# Patient Record
Sex: FEMALE | Race: BLACK OR AFRICAN AMERICAN | NOT HISPANIC OR LATINO | URBAN - METROPOLITAN AREA
[De-identification: names, ages, dates, MRNs, and addresses within clinical notes are randomized per-mention and may not be internally consistent; named-entity substitution may affect disease eponyms.]

---

## 2023-04-14 PROBLEM — Z00.00 ENCOUNTER FOR PREVENTIVE HEALTH EXAMINATION: Status: ACTIVE | Noted: 2023-04-14

## 2023-10-25 ENCOUNTER — INPATIENT (INPATIENT)
Facility: HOSPITAL | Age: 68
LOS: 6 days | Discharge: HOME HEALTH | End: 2023-11-01
Attending: INTERNAL MEDICINE | Admitting: INTERNAL MEDICINE
Payer: MEDICARE

## 2023-10-25 VITALS
SYSTOLIC BLOOD PRESSURE: 134 MMHG | RESPIRATION RATE: 15 BRPM | TEMPERATURE: 98 F | DIASTOLIC BLOOD PRESSURE: 83 MMHG | HEART RATE: 82 BPM | OXYGEN SATURATION: 98 %

## 2023-10-25 DIAGNOSIS — Z93.3 COLOSTOMY STATUS: Chronic | ICD-10-CM

## 2023-10-25 DIAGNOSIS — Z87.19 PERSONAL HISTORY OF OTHER DISEASES OF THE DIGESTIVE SYSTEM: Chronic | ICD-10-CM

## 2023-10-25 DIAGNOSIS — Z98.890 OTHER SPECIFIED POSTPROCEDURAL STATES: Chronic | ICD-10-CM

## 2023-10-25 LAB
A1C WITH ESTIMATED AVERAGE GLUCOSE RESULT: 6.4 % — HIGH (ref 4–5.6)
A1C WITH ESTIMATED AVERAGE GLUCOSE RESULT: 6.4 % — HIGH (ref 4–5.6)
ALBUMIN SERPL ELPH-MCNC: 4 G/DL — SIGNIFICANT CHANGE UP (ref 3.3–5)
ALBUMIN SERPL ELPH-MCNC: 4 G/DL — SIGNIFICANT CHANGE UP (ref 3.3–5)
ALP SERPL-CCNC: 102 U/L — SIGNIFICANT CHANGE UP (ref 40–120)
ALP SERPL-CCNC: 102 U/L — SIGNIFICANT CHANGE UP (ref 40–120)
ALT FLD-CCNC: 7 U/L — SIGNIFICANT CHANGE UP (ref 4–33)
ALT FLD-CCNC: 7 U/L — SIGNIFICANT CHANGE UP (ref 4–33)
ANION GAP SERPL CALC-SCNC: 9 MMOL/L — SIGNIFICANT CHANGE UP (ref 7–14)
ANION GAP SERPL CALC-SCNC: 9 MMOL/L — SIGNIFICANT CHANGE UP (ref 7–14)
APTT BLD: 34.2 SEC — SIGNIFICANT CHANGE UP (ref 24.5–35.6)
APTT BLD: 34.2 SEC — SIGNIFICANT CHANGE UP (ref 24.5–35.6)
AST SERPL-CCNC: 7 U/L — SIGNIFICANT CHANGE UP (ref 4–32)
AST SERPL-CCNC: 7 U/L — SIGNIFICANT CHANGE UP (ref 4–32)
B PERT DNA SPEC QL NAA+PROBE: SIGNIFICANT CHANGE UP
B PERT DNA SPEC QL NAA+PROBE: SIGNIFICANT CHANGE UP
B PERT+PARAPERT DNA PNL SPEC NAA+PROBE: SIGNIFICANT CHANGE UP
B PERT+PARAPERT DNA PNL SPEC NAA+PROBE: SIGNIFICANT CHANGE UP
BASOPHILS # BLD AUTO: 0.02 K/UL — SIGNIFICANT CHANGE UP (ref 0–0.2)
BASOPHILS # BLD AUTO: 0.02 K/UL — SIGNIFICANT CHANGE UP (ref 0–0.2)
BASOPHILS NFR BLD AUTO: 0.3 % — SIGNIFICANT CHANGE UP (ref 0–2)
BASOPHILS NFR BLD AUTO: 0.3 % — SIGNIFICANT CHANGE UP (ref 0–2)
BILIRUB SERPL-MCNC: 0.3 MG/DL — SIGNIFICANT CHANGE UP (ref 0.2–1.2)
BILIRUB SERPL-MCNC: 0.3 MG/DL — SIGNIFICANT CHANGE UP (ref 0.2–1.2)
BLOOD GAS VENOUS COMPREHENSIVE RESULT: SIGNIFICANT CHANGE UP
BLOOD GAS VENOUS COMPREHENSIVE RESULT: SIGNIFICANT CHANGE UP
BORDETELLA PARAPERTUSSIS (RAPRVP): SIGNIFICANT CHANGE UP
BORDETELLA PARAPERTUSSIS (RAPRVP): SIGNIFICANT CHANGE UP
BUN SERPL-MCNC: 24 MG/DL — HIGH (ref 7–23)
BUN SERPL-MCNC: 24 MG/DL — HIGH (ref 7–23)
C PNEUM DNA SPEC QL NAA+PROBE: SIGNIFICANT CHANGE UP
C PNEUM DNA SPEC QL NAA+PROBE: SIGNIFICANT CHANGE UP
CALCIUM SERPL-MCNC: 9.1 MG/DL — SIGNIFICANT CHANGE UP (ref 8.4–10.5)
CALCIUM SERPL-MCNC: 9.1 MG/DL — SIGNIFICANT CHANGE UP (ref 8.4–10.5)
CHLORIDE SERPL-SCNC: 103 MMOL/L — SIGNIFICANT CHANGE UP (ref 98–107)
CHLORIDE SERPL-SCNC: 103 MMOL/L — SIGNIFICANT CHANGE UP (ref 98–107)
CK MB BLD-MCNC: <2 % — SIGNIFICANT CHANGE UP (ref 0–2.5)
CK MB BLD-MCNC: <2 % — SIGNIFICANT CHANGE UP (ref 0–2.5)
CK MB CFR SERPL CALC: <1 NG/ML — SIGNIFICANT CHANGE UP
CK MB CFR SERPL CALC: <1 NG/ML — SIGNIFICANT CHANGE UP
CK SERPL-CCNC: 50 U/L — SIGNIFICANT CHANGE UP (ref 25–170)
CK SERPL-CCNC: 50 U/L — SIGNIFICANT CHANGE UP (ref 25–170)
CO2 SERPL-SCNC: 31 MMOL/L — SIGNIFICANT CHANGE UP (ref 22–31)
CO2 SERPL-SCNC: 31 MMOL/L — SIGNIFICANT CHANGE UP (ref 22–31)
CREAT SERPL-MCNC: 1.49 MG/DL — HIGH (ref 0.5–1.3)
CREAT SERPL-MCNC: 1.49 MG/DL — HIGH (ref 0.5–1.3)
EGFR: 38 ML/MIN/1.73M2 — LOW
EGFR: 38 ML/MIN/1.73M2 — LOW
EOSINOPHIL # BLD AUTO: 0.22 K/UL — SIGNIFICANT CHANGE UP (ref 0–0.5)
EOSINOPHIL # BLD AUTO: 0.22 K/UL — SIGNIFICANT CHANGE UP (ref 0–0.5)
EOSINOPHIL NFR BLD AUTO: 2.9 % — SIGNIFICANT CHANGE UP (ref 0–6)
EOSINOPHIL NFR BLD AUTO: 2.9 % — SIGNIFICANT CHANGE UP (ref 0–6)
ESTIMATED AVERAGE GLUCOSE: 137 — SIGNIFICANT CHANGE UP
ESTIMATED AVERAGE GLUCOSE: 137 — SIGNIFICANT CHANGE UP
FLUAV SUBTYP SPEC NAA+PROBE: SIGNIFICANT CHANGE UP
FLUAV SUBTYP SPEC NAA+PROBE: SIGNIFICANT CHANGE UP
FLUBV RNA SPEC QL NAA+PROBE: SIGNIFICANT CHANGE UP
FLUBV RNA SPEC QL NAA+PROBE: SIGNIFICANT CHANGE UP
GLUCOSE SERPL-MCNC: 142 MG/DL — HIGH (ref 70–99)
GLUCOSE SERPL-MCNC: 142 MG/DL — HIGH (ref 70–99)
HADV DNA SPEC QL NAA+PROBE: SIGNIFICANT CHANGE UP
HADV DNA SPEC QL NAA+PROBE: SIGNIFICANT CHANGE UP
HCOV 229E RNA SPEC QL NAA+PROBE: SIGNIFICANT CHANGE UP
HCOV 229E RNA SPEC QL NAA+PROBE: SIGNIFICANT CHANGE UP
HCOV HKU1 RNA SPEC QL NAA+PROBE: SIGNIFICANT CHANGE UP
HCOV HKU1 RNA SPEC QL NAA+PROBE: SIGNIFICANT CHANGE UP
HCOV NL63 RNA SPEC QL NAA+PROBE: SIGNIFICANT CHANGE UP
HCOV NL63 RNA SPEC QL NAA+PROBE: SIGNIFICANT CHANGE UP
HCOV OC43 RNA SPEC QL NAA+PROBE: SIGNIFICANT CHANGE UP
HCOV OC43 RNA SPEC QL NAA+PROBE: SIGNIFICANT CHANGE UP
HCT VFR BLD CALC: 46 % — HIGH (ref 34.5–45)
HCT VFR BLD CALC: 46 % — HIGH (ref 34.5–45)
HGB BLD-MCNC: 14.2 G/DL — SIGNIFICANT CHANGE UP (ref 11.5–15.5)
HGB BLD-MCNC: 14.2 G/DL — SIGNIFICANT CHANGE UP (ref 11.5–15.5)
HMPV RNA SPEC QL NAA+PROBE: SIGNIFICANT CHANGE UP
HMPV RNA SPEC QL NAA+PROBE: SIGNIFICANT CHANGE UP
HPIV1 RNA SPEC QL NAA+PROBE: SIGNIFICANT CHANGE UP
HPIV1 RNA SPEC QL NAA+PROBE: SIGNIFICANT CHANGE UP
HPIV2 RNA SPEC QL NAA+PROBE: SIGNIFICANT CHANGE UP
HPIV2 RNA SPEC QL NAA+PROBE: SIGNIFICANT CHANGE UP
HPIV3 RNA SPEC QL NAA+PROBE: SIGNIFICANT CHANGE UP
HPIV3 RNA SPEC QL NAA+PROBE: SIGNIFICANT CHANGE UP
HPIV4 RNA SPEC QL NAA+PROBE: SIGNIFICANT CHANGE UP
HPIV4 RNA SPEC QL NAA+PROBE: SIGNIFICANT CHANGE UP
IANC: 4.91 K/UL — SIGNIFICANT CHANGE UP (ref 1.8–7.4)
IANC: 4.91 K/UL — SIGNIFICANT CHANGE UP (ref 1.8–7.4)
IMM GRANULOCYTES NFR BLD AUTO: 0.3 % — SIGNIFICANT CHANGE UP (ref 0–0.9)
IMM GRANULOCYTES NFR BLD AUTO: 0.3 % — SIGNIFICANT CHANGE UP (ref 0–0.9)
INR BLD: 1.09 RATIO — SIGNIFICANT CHANGE UP (ref 0.85–1.18)
INR BLD: 1.09 RATIO — SIGNIFICANT CHANGE UP (ref 0.85–1.18)
LYMPHOCYTES # BLD AUTO: 1.76 K/UL — SIGNIFICANT CHANGE UP (ref 1–3.3)
LYMPHOCYTES # BLD AUTO: 1.76 K/UL — SIGNIFICANT CHANGE UP (ref 1–3.3)
LYMPHOCYTES # BLD AUTO: 23.5 % — SIGNIFICANT CHANGE UP (ref 13–44)
LYMPHOCYTES # BLD AUTO: 23.5 % — SIGNIFICANT CHANGE UP (ref 13–44)
M PNEUMO DNA SPEC QL NAA+PROBE: SIGNIFICANT CHANGE UP
M PNEUMO DNA SPEC QL NAA+PROBE: SIGNIFICANT CHANGE UP
MCHC RBC-ENTMCNC: 29.7 PG — SIGNIFICANT CHANGE UP (ref 27–34)
MCHC RBC-ENTMCNC: 29.7 PG — SIGNIFICANT CHANGE UP (ref 27–34)
MCHC RBC-ENTMCNC: 30.9 GM/DL — LOW (ref 32–36)
MCHC RBC-ENTMCNC: 30.9 GM/DL — LOW (ref 32–36)
MCV RBC AUTO: 96.2 FL — SIGNIFICANT CHANGE UP (ref 80–100)
MCV RBC AUTO: 96.2 FL — SIGNIFICANT CHANGE UP (ref 80–100)
MONOCYTES # BLD AUTO: 0.55 K/UL — SIGNIFICANT CHANGE UP (ref 0–0.9)
MONOCYTES # BLD AUTO: 0.55 K/UL — SIGNIFICANT CHANGE UP (ref 0–0.9)
MONOCYTES NFR BLD AUTO: 7.4 % — SIGNIFICANT CHANGE UP (ref 2–14)
MONOCYTES NFR BLD AUTO: 7.4 % — SIGNIFICANT CHANGE UP (ref 2–14)
NEUTROPHILS # BLD AUTO: 4.91 K/UL — SIGNIFICANT CHANGE UP (ref 1.8–7.4)
NEUTROPHILS # BLD AUTO: 4.91 K/UL — SIGNIFICANT CHANGE UP (ref 1.8–7.4)
NEUTROPHILS NFR BLD AUTO: 65.6 % — SIGNIFICANT CHANGE UP (ref 43–77)
NEUTROPHILS NFR BLD AUTO: 65.6 % — SIGNIFICANT CHANGE UP (ref 43–77)
NRBC # BLD: 0 /100 WBCS — SIGNIFICANT CHANGE UP (ref 0–0)
NRBC # BLD: 0 /100 WBCS — SIGNIFICANT CHANGE UP (ref 0–0)
NRBC # FLD: 0 K/UL — SIGNIFICANT CHANGE UP (ref 0–0)
NRBC # FLD: 0 K/UL — SIGNIFICANT CHANGE UP (ref 0–0)
NT-PROBNP SERPL-SCNC: 365 PG/ML — HIGH
NT-PROBNP SERPL-SCNC: 365 PG/ML — HIGH
PLATELET # BLD AUTO: 223 K/UL — SIGNIFICANT CHANGE UP (ref 150–400)
PLATELET # BLD AUTO: 223 K/UL — SIGNIFICANT CHANGE UP (ref 150–400)
POTASSIUM SERPL-MCNC: 4.1 MMOL/L — SIGNIFICANT CHANGE UP (ref 3.5–5.3)
POTASSIUM SERPL-MCNC: 4.1 MMOL/L — SIGNIFICANT CHANGE UP (ref 3.5–5.3)
POTASSIUM SERPL-SCNC: 4.1 MMOL/L — SIGNIFICANT CHANGE UP (ref 3.5–5.3)
POTASSIUM SERPL-SCNC: 4.1 MMOL/L — SIGNIFICANT CHANGE UP (ref 3.5–5.3)
PROT SERPL-MCNC: 7.6 G/DL — SIGNIFICANT CHANGE UP (ref 6–8.3)
PROT SERPL-MCNC: 7.6 G/DL — SIGNIFICANT CHANGE UP (ref 6–8.3)
PROTHROM AB SERPL-ACNC: 12.3 SEC — SIGNIFICANT CHANGE UP (ref 9.5–13)
PROTHROM AB SERPL-ACNC: 12.3 SEC — SIGNIFICANT CHANGE UP (ref 9.5–13)
RAPID RVP RESULT: SIGNIFICANT CHANGE UP
RAPID RVP RESULT: SIGNIFICANT CHANGE UP
RBC # BLD: 4.78 M/UL — SIGNIFICANT CHANGE UP (ref 3.8–5.2)
RBC # BLD: 4.78 M/UL — SIGNIFICANT CHANGE UP (ref 3.8–5.2)
RBC # FLD: 13.9 % — SIGNIFICANT CHANGE UP (ref 10.3–14.5)
RBC # FLD: 13.9 % — SIGNIFICANT CHANGE UP (ref 10.3–14.5)
RSV RNA SPEC QL NAA+PROBE: SIGNIFICANT CHANGE UP
RSV RNA SPEC QL NAA+PROBE: SIGNIFICANT CHANGE UP
RV+EV RNA SPEC QL NAA+PROBE: SIGNIFICANT CHANGE UP
RV+EV RNA SPEC QL NAA+PROBE: SIGNIFICANT CHANGE UP
SARS-COV-2 RNA SPEC QL NAA+PROBE: SIGNIFICANT CHANGE UP
SARS-COV-2 RNA SPEC QL NAA+PROBE: SIGNIFICANT CHANGE UP
SODIUM SERPL-SCNC: 143 MMOL/L — SIGNIFICANT CHANGE UP (ref 135–145)
SODIUM SERPL-SCNC: 143 MMOL/L — SIGNIFICANT CHANGE UP (ref 135–145)
TROPONIN T, HIGH SENSITIVITY RESULT: 14 NG/L — SIGNIFICANT CHANGE UP
TROPONIN T, HIGH SENSITIVITY RESULT: 14 NG/L — SIGNIFICANT CHANGE UP
WBC # BLD: 7.48 K/UL — SIGNIFICANT CHANGE UP (ref 3.8–10.5)
WBC # BLD: 7.48 K/UL — SIGNIFICANT CHANGE UP (ref 3.8–10.5)
WBC # FLD AUTO: 7.48 K/UL — SIGNIFICANT CHANGE UP (ref 3.8–10.5)
WBC # FLD AUTO: 7.48 K/UL — SIGNIFICANT CHANGE UP (ref 3.8–10.5)

## 2023-10-25 PROCEDURE — 99285 EMERGENCY DEPT VISIT HI MDM: CPT

## 2023-10-25 PROCEDURE — 71045 X-RAY EXAM CHEST 1 VIEW: CPT | Mod: 26

## 2023-10-25 RX ORDER — IPRATROPIUM/ALBUTEROL SULFATE 18-103MCG
3 AEROSOL WITH ADAPTER (GRAM) INHALATION ONCE
Refills: 0 | Status: COMPLETED | OUTPATIENT
Start: 2023-10-25 | End: 2023-10-25

## 2023-10-25 RX ORDER — PIPERACILLIN AND TAZOBACTAM 4; .5 G/20ML; G/20ML
3.38 INJECTION, POWDER, LYOPHILIZED, FOR SOLUTION INTRAVENOUS ONCE
Refills: 0 | Status: COMPLETED | OUTPATIENT
Start: 2023-10-25 | End: 2023-10-25

## 2023-10-25 RX ORDER — AZITHROMYCIN 500 MG/1
500 TABLET, FILM COATED ORAL ONCE
Refills: 0 | Status: DISCONTINUED | OUTPATIENT
Start: 2023-10-25 | End: 2023-10-25

## 2023-10-25 RX ORDER — DEXAMETHASONE 0.5 MG/5ML
6 ELIXIR ORAL ONCE
Refills: 0 | Status: COMPLETED | OUTPATIENT
Start: 2023-10-25 | End: 2023-10-25

## 2023-10-25 RX ORDER — VANCOMYCIN HCL 1 G
1000 VIAL (EA) INTRAVENOUS ONCE
Refills: 0 | Status: COMPLETED | OUTPATIENT
Start: 2023-10-25 | End: 2023-10-25

## 2023-10-25 RX ADMIN — Medication 250 MILLIGRAM(S): at 23:08

## 2023-10-25 RX ADMIN — PIPERACILLIN AND TAZOBACTAM 200 GRAM(S): 4; .5 INJECTION, POWDER, LYOPHILIZED, FOR SOLUTION INTRAVENOUS at 22:32

## 2023-10-25 RX ADMIN — Medication 3 MILLILITER(S): at 22:32

## 2023-10-25 RX ADMIN — Medication 6 MILLIGRAM(S): at 22:32

## 2023-10-25 NOTE — ED ADULT TRIAGE NOTE - CHIEF COMPLAINT QUOTE
Pt coming from [facility] MD c/o worsening shortness of breath with green sputum and chest pressure x1 week. Hx. DM2. CHF. COPD. (on 3L nasal canula at home). Pt able to speak in full sentences. Pt well appearing. EKG in progress.

## 2023-10-25 NOTE — ED PROVIDER NOTE - CARE PLAN
1 Principal Discharge DX:	COPD exacerbation  Secondary Diagnosis:	Hospital acquired PNA  Secondary Diagnosis:	Chest pain

## 2023-10-25 NOTE — ED PROVIDER NOTE - ATTENDING APP SHARED VISIT CONTRIBUTION OF CARE
I performed a face to face evaluation of this patient and obtained a history and performed a full exam.  I agree with the history, physical exam and plan of the AMBAR

## 2023-10-25 NOTE — ED PROVIDER NOTE - NSICDXPASTSURGICALHX_GEN_ALL_CORE_FT
PAST SURGICAL HISTORY:  History of colostomy reversal     S/P colostomy     S/p small bowel obstruction

## 2023-10-25 NOTE — ED PROVIDER NOTE - OBJECTIVE STATEMENT
29 Y/O F PMH CHF DM2 COPD on 3L home O2 presents with increased shortness of breath for the past week, chest pressure x 1 week and also notes a cough productive of green sputum for the past week. Pt states she was just discharged from [facility] for abdominal pain and states a patient that was near her at that facility had a cough. Pt states she has begun to use 4LPM rather than her usual 3 because of her symptoms. Pt is a former smoker. Pt denies fever chills or nightsweats, denies any other sx or acute complaints. 31 Y/O F PMH CHF DM2 COPD on 3L home O2 presents with increased shortness of breath for the past week, chest pressure x 1 week and also notes a cough productive of green sputum for the past week. Pt states she was just discharged from [facility] for abdominal pain and states a patient that was near her at that facility had a cough. Pt states she has begun to use 4LPM rather than her usual 3 because of her symptoms. Pt was at [facility] and was referred to the ED for further evaluation of her symptoms. Pt is a former smoker. Pt denies fever chills or nightsweats, denies any other sx or acute complaints. 69 Y/O F PMH CHF DM2 COPD on 3L home O2 presents with increased shortness of breath for the past week, chest pressure x 1 week and also notes a cough productive of green sputum for the past week. Pt states she was just discharged from [facility] for abdominal pain and states a patient that was near her at that facility had a cough. Pt states she has begun to use 4LPM rather than her usual 3 because of her symptoms. Pt was at [facility] and was referred to the ED for further evaluation of her symptoms. Pt is a former smoker. Pt denies fever chills or nightsweats, denies any other sx or acute complaints.

## 2023-10-25 NOTE — ED ADULT NURSE NOTE - NSFALLHARMRISKINTERV_ED_ALL_ED

## 2023-10-25 NOTE — ED PROVIDER NOTE - NS ED ATTENDING STATEMENT MOD
This was a shared visit with the AMBAR. I reviewed and verified the documentation and independently performed the documented:

## 2023-10-25 NOTE — ED ADULT NURSE NOTE - OBJECTIVE STATEMENT
Patient received in ED intake room 7. A&Ox4 and ambulatory with rolling walker (at bedside). Past medical history of COPD (on 3L home o2 nasal canula), CHF. C/o worsening SOB, chest pressure and productive cough with green sputum x 1 week. Pt states noticed symptoms after discharge from another hospital last week, pt states was near a sick patient at other hospital with cough.  Pt noted to be on 4L NC, O2 saturation is 99%, ARTHUR Zhou made aware. Placed on bedside cardiac monitor - NSR. Respirations even and unlabored. No acute distress noted. HOB elevated. Speaking in full and complete sentences. IV 20g placed to right AC, labs drawn and sent as ordered. Medicated as ordered, see MAR. Bed in lowest position, call bell in and, fall precautions in effect, wheels locked, appropriate side rails up, safety maintained. Awaiting further orders.

## 2023-10-25 NOTE — ED PROVIDER NOTE - BREATH SOUNDS
+ Yaw at the R base. Pt is speaking in full sentences without difficulty, no cyanosis or tripod posture.

## 2023-10-25 NOTE — ED ADULT NURSE NOTE - CHIEF COMPLAINT QUOTE
Pt coming from [facility] MD c/o worsening shortness of breath with green sputum and chest pressure x1 week. Hx. DM2. CHF. COPD. (on 3L nasal canula at home). Pt able to speak in full sentences. Pt well appearing. EKG in progress.

## 2023-10-25 NOTE — ED PROVIDER NOTE - CLINICAL SUMMARY MEDICAL DECISION MAKING FREE TEXT BOX
31 Y/O F PMH CHF DM2 COPD on 3L home O2 presents with increased shortness of breath for the past week, chest pressure x 1 week and also notes a cough productive of green sputum for the past week. Pt states she was just discharged from [facility] for abdominal pain and states a patient that was near her at that facility had a cough. Plan is labs to eval for anemia or electrolyte disturbance, EKG and troponin to eval for an atypical presentation of ACS, RVP to eval for viral etiology, cxr to eval for consolidation and blood gas to eval for respiratory status/CO2 retention. WIll treat for possible Hospital Acquired Pneumonia with vancomycin and zosyn, Azithromycin initially ordered but cancelled due to QT on EKG, will give Duoneb treatement and IV Dexamethasone as well.

## 2023-10-26 DIAGNOSIS — Z86.718 PERSONAL HISTORY OF OTHER VENOUS THROMBOSIS AND EMBOLISM: ICD-10-CM

## 2023-10-26 DIAGNOSIS — I50.9 HEART FAILURE, UNSPECIFIED: ICD-10-CM

## 2023-10-26 DIAGNOSIS — K59.00 CONSTIPATION, UNSPECIFIED: ICD-10-CM

## 2023-10-26 DIAGNOSIS — I27.20 PULMONARY HYPERTENSION, UNSPECIFIED: ICD-10-CM

## 2023-10-26 DIAGNOSIS — R07.9 CHEST PAIN, UNSPECIFIED: ICD-10-CM

## 2023-10-26 DIAGNOSIS — G47.00 INSOMNIA, UNSPECIFIED: ICD-10-CM

## 2023-10-26 DIAGNOSIS — Z29.9 ENCOUNTER FOR PROPHYLACTIC MEASURES, UNSPECIFIED: ICD-10-CM

## 2023-10-26 DIAGNOSIS — Z87.39 PERSONAL HISTORY OF OTHER DISEASES OF THE MUSCULOSKELETAL SYSTEM AND CONNECTIVE TISSUE: ICD-10-CM

## 2023-10-26 DIAGNOSIS — J44.1 CHRONIC OBSTRUCTIVE PULMONARY DISEASE WITH (ACUTE) EXACERBATION: ICD-10-CM

## 2023-10-26 DIAGNOSIS — J96.21 ACUTE AND CHRONIC RESPIRATORY FAILURE WITH HYPOXIA: ICD-10-CM

## 2023-10-26 DIAGNOSIS — E11.65 TYPE 2 DIABETES MELLITUS WITH HYPERGLYCEMIA: ICD-10-CM

## 2023-10-26 DIAGNOSIS — J18.9 PNEUMONIA, UNSPECIFIED ORGANISM: ICD-10-CM

## 2023-10-26 LAB
ALBUMIN SERPL ELPH-MCNC: 4.3 G/DL — SIGNIFICANT CHANGE UP (ref 3.3–5)
ALBUMIN SERPL ELPH-MCNC: 4.3 G/DL — SIGNIFICANT CHANGE UP (ref 3.3–5)
ALP SERPL-CCNC: 106 U/L — SIGNIFICANT CHANGE UP (ref 40–120)
ALP SERPL-CCNC: 106 U/L — SIGNIFICANT CHANGE UP (ref 40–120)
ALT FLD-CCNC: 7 U/L — SIGNIFICANT CHANGE UP (ref 4–33)
ALT FLD-CCNC: 7 U/L — SIGNIFICANT CHANGE UP (ref 4–33)
ANION GAP SERPL CALC-SCNC: 17 MMOL/L — HIGH (ref 7–14)
ANION GAP SERPL CALC-SCNC: 17 MMOL/L — HIGH (ref 7–14)
AST SERPL-CCNC: 8 U/L — SIGNIFICANT CHANGE UP (ref 4–32)
AST SERPL-CCNC: 8 U/L — SIGNIFICANT CHANGE UP (ref 4–32)
BILIRUB SERPL-MCNC: 0.4 MG/DL — SIGNIFICANT CHANGE UP (ref 0.2–1.2)
BILIRUB SERPL-MCNC: 0.4 MG/DL — SIGNIFICANT CHANGE UP (ref 0.2–1.2)
BUN SERPL-MCNC: 28 MG/DL — HIGH (ref 7–23)
BUN SERPL-MCNC: 28 MG/DL — HIGH (ref 7–23)
CALCIUM SERPL-MCNC: 10.1 MG/DL — SIGNIFICANT CHANGE UP (ref 8.4–10.5)
CALCIUM SERPL-MCNC: 10.1 MG/DL — SIGNIFICANT CHANGE UP (ref 8.4–10.5)
CHLORIDE SERPL-SCNC: 96 MMOL/L — LOW (ref 98–107)
CHLORIDE SERPL-SCNC: 96 MMOL/L — LOW (ref 98–107)
CK MB BLD-MCNC: <2 % — SIGNIFICANT CHANGE UP (ref 0–2.5)
CK MB BLD-MCNC: <2 % — SIGNIFICANT CHANGE UP (ref 0–2.5)
CK MB CFR SERPL CALC: <1 NG/ML — SIGNIFICANT CHANGE UP
CK MB CFR SERPL CALC: <1 NG/ML — SIGNIFICANT CHANGE UP
CK SERPL-CCNC: 51 U/L — SIGNIFICANT CHANGE UP (ref 25–170)
CK SERPL-CCNC: 51 U/L — SIGNIFICANT CHANGE UP (ref 25–170)
CO2 SERPL-SCNC: 27 MMOL/L — SIGNIFICANT CHANGE UP (ref 22–31)
CO2 SERPL-SCNC: 27 MMOL/L — SIGNIFICANT CHANGE UP (ref 22–31)
CREAT SERPL-MCNC: 1.38 MG/DL — HIGH (ref 0.5–1.3)
CREAT SERPL-MCNC: 1.38 MG/DL — HIGH (ref 0.5–1.3)
EGFR: 42 ML/MIN/1.73M2 — LOW
EGFR: 42 ML/MIN/1.73M2 — LOW
GLUCOSE BLDC GLUCOMTR-MCNC: 212 MG/DL — HIGH (ref 70–99)
GLUCOSE BLDC GLUCOMTR-MCNC: 212 MG/DL — HIGH (ref 70–99)
GLUCOSE BLDC GLUCOMTR-MCNC: 215 MG/DL — HIGH (ref 70–99)
GLUCOSE BLDC GLUCOMTR-MCNC: 215 MG/DL — HIGH (ref 70–99)
GLUCOSE BLDC GLUCOMTR-MCNC: 224 MG/DL — HIGH (ref 70–99)
GLUCOSE BLDC GLUCOMTR-MCNC: 224 MG/DL — HIGH (ref 70–99)
GLUCOSE BLDC GLUCOMTR-MCNC: 235 MG/DL — HIGH (ref 70–99)
GLUCOSE BLDC GLUCOMTR-MCNC: 235 MG/DL — HIGH (ref 70–99)
GLUCOSE BLDC GLUCOMTR-MCNC: 276 MG/DL — HIGH (ref 70–99)
GLUCOSE BLDC GLUCOMTR-MCNC: 276 MG/DL — HIGH (ref 70–99)
GLUCOSE BLDC GLUCOMTR-MCNC: 293 MG/DL — HIGH (ref 70–99)
GLUCOSE BLDC GLUCOMTR-MCNC: 293 MG/DL — HIGH (ref 70–99)
GLUCOSE BLDC GLUCOMTR-MCNC: 311 MG/DL — HIGH (ref 70–99)
GLUCOSE BLDC GLUCOMTR-MCNC: 311 MG/DL — HIGH (ref 70–99)
GLUCOSE BLDC GLUCOMTR-MCNC: 318 MG/DL — HIGH (ref 70–99)
GLUCOSE BLDC GLUCOMTR-MCNC: 318 MG/DL — HIGH (ref 70–99)
GLUCOSE SERPL-MCNC: 303 MG/DL — HIGH (ref 70–99)
GLUCOSE SERPL-MCNC: 303 MG/DL — HIGH (ref 70–99)
GRAM STN FLD: ABNORMAL
GRAM STN FLD: ABNORMAL
HCT VFR BLD CALC: 49.9 % — HIGH (ref 34.5–45)
HCT VFR BLD CALC: 49.9 % — HIGH (ref 34.5–45)
HGB BLD-MCNC: 15.2 G/DL — SIGNIFICANT CHANGE UP (ref 11.5–15.5)
HGB BLD-MCNC: 15.2 G/DL — SIGNIFICANT CHANGE UP (ref 11.5–15.5)
LEGIONELLA AG UR QL: NEGATIVE — SIGNIFICANT CHANGE UP
LEGIONELLA AG UR QL: NEGATIVE — SIGNIFICANT CHANGE UP
MAGNESIUM SERPL-MCNC: 2.5 MG/DL — SIGNIFICANT CHANGE UP (ref 1.6–2.6)
MAGNESIUM SERPL-MCNC: 2.5 MG/DL — SIGNIFICANT CHANGE UP (ref 1.6–2.6)
MCHC RBC-ENTMCNC: 29.1 PG — SIGNIFICANT CHANGE UP (ref 27–34)
MCHC RBC-ENTMCNC: 29.1 PG — SIGNIFICANT CHANGE UP (ref 27–34)
MCHC RBC-ENTMCNC: 30.5 GM/DL — LOW (ref 32–36)
MCHC RBC-ENTMCNC: 30.5 GM/DL — LOW (ref 32–36)
MCV RBC AUTO: 95.6 FL — SIGNIFICANT CHANGE UP (ref 80–100)
MCV RBC AUTO: 95.6 FL — SIGNIFICANT CHANGE UP (ref 80–100)
MRSA PCR RESULT.: SIGNIFICANT CHANGE UP
MRSA PCR RESULT.: SIGNIFICANT CHANGE UP
NRBC # BLD: 0 /100 WBCS — SIGNIFICANT CHANGE UP (ref 0–0)
NRBC # BLD: 0 /100 WBCS — SIGNIFICANT CHANGE UP (ref 0–0)
NRBC # FLD: 0 K/UL — SIGNIFICANT CHANGE UP (ref 0–0)
NRBC # FLD: 0 K/UL — SIGNIFICANT CHANGE UP (ref 0–0)
PHOSPHATE SERPL-MCNC: 4.2 MG/DL — SIGNIFICANT CHANGE UP (ref 2.5–4.5)
PHOSPHATE SERPL-MCNC: 4.2 MG/DL — SIGNIFICANT CHANGE UP (ref 2.5–4.5)
PLATELET # BLD AUTO: 226 K/UL — SIGNIFICANT CHANGE UP (ref 150–400)
PLATELET # BLD AUTO: 226 K/UL — SIGNIFICANT CHANGE UP (ref 150–400)
POTASSIUM SERPL-MCNC: 4.2 MMOL/L — SIGNIFICANT CHANGE UP (ref 3.5–5.3)
POTASSIUM SERPL-MCNC: 4.2 MMOL/L — SIGNIFICANT CHANGE UP (ref 3.5–5.3)
POTASSIUM SERPL-SCNC: 4.2 MMOL/L — SIGNIFICANT CHANGE UP (ref 3.5–5.3)
POTASSIUM SERPL-SCNC: 4.2 MMOL/L — SIGNIFICANT CHANGE UP (ref 3.5–5.3)
PROCALCITONIN SERPL-MCNC: 0.07 NG/ML — SIGNIFICANT CHANGE UP (ref 0.02–0.1)
PROCALCITONIN SERPL-MCNC: 0.07 NG/ML — SIGNIFICANT CHANGE UP (ref 0.02–0.1)
PROT SERPL-MCNC: 9 G/DL — HIGH (ref 6–8.3)
PROT SERPL-MCNC: 9 G/DL — HIGH (ref 6–8.3)
RBC # BLD: 5.22 M/UL — HIGH (ref 3.8–5.2)
RBC # BLD: 5.22 M/UL — HIGH (ref 3.8–5.2)
RBC # FLD: 14 % — SIGNIFICANT CHANGE UP (ref 10.3–14.5)
RBC # FLD: 14 % — SIGNIFICANT CHANGE UP (ref 10.3–14.5)
S AUREUS DNA NOSE QL NAA+PROBE: DETECTED
S AUREUS DNA NOSE QL NAA+PROBE: DETECTED
SODIUM SERPL-SCNC: 140 MMOL/L — SIGNIFICANT CHANGE UP (ref 135–145)
SODIUM SERPL-SCNC: 140 MMOL/L — SIGNIFICANT CHANGE UP (ref 135–145)
SPECIMEN SOURCE: SIGNIFICANT CHANGE UP
SPECIMEN SOURCE: SIGNIFICANT CHANGE UP
TROPONIN T, HIGH SENSITIVITY RESULT: 12 NG/L — SIGNIFICANT CHANGE UP
TROPONIN T, HIGH SENSITIVITY RESULT: 12 NG/L — SIGNIFICANT CHANGE UP
WBC # BLD: 6.79 K/UL — SIGNIFICANT CHANGE UP (ref 3.8–10.5)
WBC # BLD: 6.79 K/UL — SIGNIFICANT CHANGE UP (ref 3.8–10.5)
WBC # FLD AUTO: 6.79 K/UL — SIGNIFICANT CHANGE UP (ref 3.8–10.5)
WBC # FLD AUTO: 6.79 K/UL — SIGNIFICANT CHANGE UP (ref 3.8–10.5)

## 2023-10-26 PROCEDURE — 71250 CT THORAX DX C-: CPT | Mod: 26

## 2023-10-26 PROCEDURE — 99223 1ST HOSP IP/OBS HIGH 75: CPT

## 2023-10-26 PROCEDURE — 99222 1ST HOSP IP/OBS MODERATE 55: CPT | Mod: GC

## 2023-10-26 RX ORDER — HYDROXYZINE HCL 10 MG
25 TABLET ORAL
Refills: 0 | Status: DISCONTINUED | OUTPATIENT
Start: 2023-10-26 | End: 2023-11-01

## 2023-10-26 RX ORDER — CEFTRIAXONE 500 MG/1
1000 INJECTION, POWDER, FOR SOLUTION INTRAMUSCULAR; INTRAVENOUS EVERY 24 HOURS
Refills: 0 | Status: DISCONTINUED | OUTPATIENT
Start: 2023-10-26 | End: 2023-10-27

## 2023-10-26 RX ORDER — SPIRONOLACTONE 25 MG/1
0 TABLET, FILM COATED ORAL
Qty: 0 | Refills: 0 | DISCHARGE

## 2023-10-26 RX ORDER — SUMATRIPTAN SUCCINATE 4 MG/.5ML
0 INJECTION, SOLUTION SUBCUTANEOUS
Qty: 0 | Refills: 0 | DISCHARGE

## 2023-10-26 RX ORDER — APIXABAN 2.5 MG/1
5 TABLET, FILM COATED ORAL
Refills: 0 | Status: DISCONTINUED | OUTPATIENT
Start: 2023-10-26 | End: 2023-11-01

## 2023-10-26 RX ORDER — SODIUM CHLORIDE 9 MG/ML
1000 INJECTION, SOLUTION INTRAVENOUS
Refills: 0 | Status: DISCONTINUED | OUTPATIENT
Start: 2023-10-26 | End: 2023-11-01

## 2023-10-26 RX ORDER — DEXTROSE 50 % IN WATER 50 %
15 SYRINGE (ML) INTRAVENOUS ONCE
Refills: 0 | Status: DISCONTINUED | OUTPATIENT
Start: 2023-10-26 | End: 2023-11-01

## 2023-10-26 RX ORDER — TADALAFIL 10 MG/1
20 TABLET, FILM COATED ORAL DAILY
Refills: 0 | Status: DISCONTINUED | OUTPATIENT
Start: 2023-10-26 | End: 2023-11-01

## 2023-10-26 RX ORDER — DEXTROSE 50 % IN WATER 50 %
25 SYRINGE (ML) INTRAVENOUS ONCE
Refills: 0 | Status: DISCONTINUED | OUTPATIENT
Start: 2023-10-26 | End: 2023-11-01

## 2023-10-26 RX ORDER — ATORVASTATIN CALCIUM 80 MG/1
80 TABLET, FILM COATED ORAL AT BEDTIME
Refills: 0 | Status: DISCONTINUED | OUTPATIENT
Start: 2023-10-26 | End: 2023-11-01

## 2023-10-26 RX ORDER — SPIRONOLACTONE 25 MG/1
25 TABLET, FILM COATED ORAL
Refills: 0 | Status: DISCONTINUED | OUTPATIENT
Start: 2023-10-27 | End: 2023-10-28

## 2023-10-26 RX ORDER — INSULIN GLARGINE 100 [IU]/ML
30 INJECTION, SOLUTION SUBCUTANEOUS ONCE
Refills: 0 | Status: COMPLETED | OUTPATIENT
Start: 2023-10-26 | End: 2023-10-26

## 2023-10-26 RX ORDER — SENNA PLUS 8.6 MG/1
2 TABLET ORAL AT BEDTIME
Refills: 0 | Status: DISCONTINUED | OUTPATIENT
Start: 2023-10-26 | End: 2023-10-31

## 2023-10-26 RX ORDER — TRAZODONE HCL 50 MG
150 TABLET ORAL ONCE
Refills: 0 | Status: COMPLETED | OUTPATIENT
Start: 2023-10-26 | End: 2023-10-26

## 2023-10-26 RX ORDER — AMLODIPINE BESYLATE 2.5 MG/1
0 TABLET ORAL
Qty: 0 | Refills: 0 | DISCHARGE

## 2023-10-26 RX ORDER — DULOXETINE HYDROCHLORIDE 30 MG/1
30 CAPSULE, DELAYED RELEASE ORAL DAILY
Refills: 0 | Status: DISCONTINUED | OUTPATIENT
Start: 2023-10-26 | End: 2023-11-01

## 2023-10-26 RX ORDER — DULOXETINE HYDROCHLORIDE 30 MG/1
0 CAPSULE, DELAYED RELEASE ORAL
Qty: 0 | Refills: 0 | DISCHARGE

## 2023-10-26 RX ORDER — LACTULOSE 10 G/15ML
10 SOLUTION ORAL
Refills: 0 | Status: DISCONTINUED | OUTPATIENT
Start: 2023-10-26 | End: 2023-11-01

## 2023-10-26 RX ORDER — CYCLOBENZAPRINE HYDROCHLORIDE 10 MG/1
10 TABLET, FILM COATED ORAL THREE TIMES A DAY
Refills: 0 | Status: DISCONTINUED | OUTPATIENT
Start: 2023-10-26 | End: 2023-11-01

## 2023-10-26 RX ORDER — FLUOXETINE HCL 10 MG
0 CAPSULE ORAL
Qty: 0 | Refills: 0 | DISCHARGE

## 2023-10-26 RX ORDER — GLUCAGON INJECTION, SOLUTION 0.5 MG/.1ML
1 INJECTION, SOLUTION SUBCUTANEOUS ONCE
Refills: 0 | Status: DISCONTINUED | OUTPATIENT
Start: 2023-10-26 | End: 2023-11-01

## 2023-10-26 RX ORDER — METHOCARBAMOL 500 MG/1
750 TABLET, FILM COATED ORAL
Refills: 0 | Status: DISCONTINUED | OUTPATIENT
Start: 2023-10-26 | End: 2023-10-27

## 2023-10-26 RX ORDER — AMLODIPINE BESYLATE 2.5 MG/1
5 TABLET ORAL DAILY
Refills: 0 | Status: DISCONTINUED | OUTPATIENT
Start: 2023-10-26 | End: 2023-11-01

## 2023-10-26 RX ORDER — INSULIN LISPRO 100/ML
VIAL (ML) SUBCUTANEOUS
Refills: 0 | Status: DISCONTINUED | OUTPATIENT
Start: 2023-10-26 | End: 2023-11-01

## 2023-10-26 RX ORDER — INSULIN LISPRO 100/ML
5 VIAL (ML) SUBCUTANEOUS
Refills: 0 | Status: DISCONTINUED | OUTPATIENT
Start: 2023-10-26 | End: 2023-10-26

## 2023-10-26 RX ORDER — AZITHROMYCIN 500 MG/1
500 TABLET, FILM COATED ORAL EVERY 24 HOURS
Refills: 0 | Status: DISCONTINUED | OUTPATIENT
Start: 2023-10-26 | End: 2023-10-28

## 2023-10-26 RX ORDER — INSULIN LISPRO 100/ML
VIAL (ML) SUBCUTANEOUS AT BEDTIME
Refills: 0 | Status: DISCONTINUED | OUTPATIENT
Start: 2023-10-26 | End: 2023-11-01

## 2023-10-26 RX ORDER — TRAZODONE HCL 50 MG
0 TABLET ORAL
Qty: 0 | Refills: 0 | DISCHARGE

## 2023-10-26 RX ORDER — TRAZODONE HCL 50 MG
150 TABLET ORAL AT BEDTIME
Refills: 0 | Status: DISCONTINUED | OUTPATIENT
Start: 2023-10-26 | End: 2023-11-01

## 2023-10-26 RX ORDER — BUDESONIDE AND FORMOTEROL FUMARATE DIHYDRATE 160; 4.5 UG/1; UG/1
2 AEROSOL RESPIRATORY (INHALATION)
Refills: 0 | Status: DISCONTINUED | OUTPATIENT
Start: 2023-10-26 | End: 2023-10-28

## 2023-10-26 RX ORDER — INSULIN ASPART 100 [IU]/ML
7 INJECTION, SOLUTION SUBCUTANEOUS
Refills: 0 | DISCHARGE

## 2023-10-26 RX ORDER — EMPAGLIFLOZIN 10 MG/1
0 TABLET, FILM COATED ORAL
Qty: 0 | Refills: 0 | DISCHARGE

## 2023-10-26 RX ORDER — POLYETHYLENE GLYCOL 3350 17 G/17G
17 POWDER, FOR SOLUTION ORAL DAILY
Refills: 0 | Status: DISCONTINUED | OUTPATIENT
Start: 2023-10-26 | End: 2023-10-31

## 2023-10-26 RX ORDER — METHOCARBAMOL 500 MG/1
2 TABLET, FILM COATED ORAL
Refills: 0 | DISCHARGE

## 2023-10-26 RX ORDER — ALBUTEROL 90 UG/1
0 AEROSOL, METERED ORAL
Qty: 0 | Refills: 0 | DISCHARGE

## 2023-10-26 RX ORDER — POLYETHYLENE GLYCOL 3350 17 G/17G
17 POWDER, FOR SOLUTION ORAL
Refills: 0 | DISCHARGE

## 2023-10-26 RX ORDER — INSULIN LISPRO 100/ML
7 VIAL (ML) SUBCUTANEOUS
Refills: 0 | Status: DISCONTINUED | OUTPATIENT
Start: 2023-10-26 | End: 2023-10-27

## 2023-10-26 RX ORDER — INSULIN GLARGINE 100 [IU]/ML
30 INJECTION, SOLUTION SUBCUTANEOUS AT BEDTIME
Refills: 0 | Status: DISCONTINUED | OUTPATIENT
Start: 2023-10-26 | End: 2023-10-26

## 2023-10-26 RX ORDER — DEXTROSE 50 % IN WATER 50 %
12.5 SYRINGE (ML) INTRAVENOUS ONCE
Refills: 0 | Status: DISCONTINUED | OUTPATIENT
Start: 2023-10-26 | End: 2023-11-01

## 2023-10-26 RX ORDER — PIPERACILLIN AND TAZOBACTAM 4; .5 G/20ML; G/20ML
3.38 INJECTION, POWDER, LYOPHILIZED, FOR SOLUTION INTRAVENOUS EVERY 8 HOURS
Refills: 0 | Status: DISCONTINUED | OUTPATIENT
Start: 2023-10-26 | End: 2023-10-26

## 2023-10-26 RX ORDER — HYDROXYZINE HCL 10 MG
25 TABLET ORAL ONCE
Refills: 0 | Status: COMPLETED | OUTPATIENT
Start: 2023-10-26 | End: 2023-10-26

## 2023-10-26 RX ORDER — FLUOXETINE HCL 10 MG
20 CAPSULE ORAL DAILY
Refills: 0 | Status: DISCONTINUED | OUTPATIENT
Start: 2023-10-26 | End: 2023-11-01

## 2023-10-26 RX ORDER — APIXABAN 2.5 MG/1
1 TABLET, FILM COATED ORAL
Refills: 0 | DISCHARGE

## 2023-10-26 RX ORDER — ACETAMINOPHEN 500 MG
650 TABLET ORAL EVERY 6 HOURS
Refills: 0 | Status: DISCONTINUED | OUTPATIENT
Start: 2023-10-26 | End: 2023-11-01

## 2023-10-26 RX ORDER — SUMATRIPTAN SUCCINATE 4 MG/.5ML
50 INJECTION, SOLUTION SUBCUTANEOUS ONCE
Refills: 0 | Status: COMPLETED | OUTPATIENT
Start: 2023-10-26 | End: 2023-10-26

## 2023-10-26 RX ORDER — CYCLOBENZAPRINE HYDROCHLORIDE 10 MG/1
0 TABLET, FILM COATED ORAL
Qty: 0 | Refills: 0 | DISCHARGE

## 2023-10-26 RX ORDER — SPIRONOLACTONE 25 MG/1
2 TABLET, FILM COATED ORAL
Refills: 0 | DISCHARGE

## 2023-10-26 RX ORDER — INSULIN GLARGINE 100 [IU]/ML
33 INJECTION, SOLUTION SUBCUTANEOUS AT BEDTIME
Refills: 0 | Status: DISCONTINUED | OUTPATIENT
Start: 2023-10-26 | End: 2023-10-27

## 2023-10-26 RX ORDER — LANOLIN ALCOHOL/MO/W.PET/CERES
3 CREAM (GRAM) TOPICAL AT BEDTIME
Refills: 0 | Status: DISCONTINUED | OUTPATIENT
Start: 2023-10-26 | End: 2023-11-01

## 2023-10-26 RX ORDER — SPIRONOLACTONE 25 MG/1
50 TABLET, FILM COATED ORAL DAILY
Refills: 0 | Status: DISCONTINUED | OUTPATIENT
Start: 2023-10-26 | End: 2023-10-26

## 2023-10-26 RX ORDER — INSULIN GLARGINE 100 [IU]/ML
40 INJECTION, SOLUTION SUBCUTANEOUS
Refills: 0 | DISCHARGE

## 2023-10-26 RX ORDER — ATORVASTATIN CALCIUM 80 MG/1
1 TABLET, FILM COATED ORAL
Refills: 0 | DISCHARGE

## 2023-10-26 RX ORDER — DULAGLUTIDE 4.5 MG/.5ML
0 INJECTION, SOLUTION SUBCUTANEOUS
Qty: 0 | Refills: 0 | DISCHARGE

## 2023-10-26 RX ORDER — IPRATROPIUM/ALBUTEROL SULFATE 18-103MCG
3 AEROSOL WITH ADAPTER (GRAM) INHALATION EVERY 6 HOURS
Refills: 0 | Status: DISCONTINUED | OUTPATIENT
Start: 2023-10-26 | End: 2023-10-28

## 2023-10-26 RX ORDER — HYDROXYZINE HCL 10 MG
0 TABLET ORAL
Qty: 0 | Refills: 0 | DISCHARGE

## 2023-10-26 RX ADMIN — Medication 4: at 11:23

## 2023-10-26 RX ADMIN — Medication 200 MILLIGRAM(S): at 12:12

## 2023-10-26 RX ADMIN — Medication 150 MILLIGRAM(S): at 02:54

## 2023-10-26 RX ADMIN — INSULIN GLARGINE 33 UNIT(S): 100 INJECTION, SOLUTION SUBCUTANEOUS at 23:33

## 2023-10-26 RX ADMIN — DULOXETINE HYDROCHLORIDE 30 MILLIGRAM(S): 30 CAPSULE, DELAYED RELEASE ORAL at 11:13

## 2023-10-26 RX ADMIN — SUMATRIPTAN SUCCINATE 50 MILLIGRAM(S): 4 INJECTION, SOLUTION SUBCUTANEOUS at 05:41

## 2023-10-26 RX ADMIN — CEFTRIAXONE 100 MILLIGRAM(S): 500 INJECTION, POWDER, FOR SOLUTION INTRAMUSCULAR; INTRAVENOUS at 17:21

## 2023-10-26 RX ADMIN — Medication 650 MILLIGRAM(S): at 11:19

## 2023-10-26 RX ADMIN — BUDESONIDE AND FORMOTEROL FUMARATE DIHYDRATE 2 PUFF(S): 160; 4.5 AEROSOL RESPIRATORY (INHALATION) at 21:36

## 2023-10-26 RX ADMIN — Medication 3 MILLIGRAM(S): at 21:31

## 2023-10-26 RX ADMIN — Medication 3 MILLILITER(S): at 16:39

## 2023-10-26 RX ADMIN — Medication 100 MILLIGRAM(S): at 15:12

## 2023-10-26 RX ADMIN — INSULIN GLARGINE 30 UNIT(S): 100 INJECTION, SOLUTION SUBCUTANEOUS at 02:54

## 2023-10-26 RX ADMIN — Medication 100 MILLIGRAM(S): at 21:31

## 2023-10-26 RX ADMIN — Medication 100 MILLIGRAM(S): at 17:19

## 2023-10-26 RX ADMIN — Medication 3: at 17:44

## 2023-10-26 RX ADMIN — ATORVASTATIN CALCIUM 80 MILLIGRAM(S): 80 TABLET, FILM COATED ORAL at 21:31

## 2023-10-26 RX ADMIN — Medication 20 MILLIGRAM(S): at 11:13

## 2023-10-26 RX ADMIN — CYCLOBENZAPRINE HYDROCHLORIDE 10 MILLIGRAM(S): 10 TABLET, FILM COATED ORAL at 17:18

## 2023-10-26 RX ADMIN — Medication 150 MILLIGRAM(S): at 21:30

## 2023-10-26 RX ADMIN — Medication 25 MILLIGRAM(S): at 02:53

## 2023-10-26 RX ADMIN — POLYETHYLENE GLYCOL 3350 17 GRAM(S): 17 POWDER, FOR SOLUTION ORAL at 02:54

## 2023-10-26 RX ADMIN — AZITHROMYCIN 500 MILLIGRAM(S): 500 TABLET, FILM COATED ORAL at 17:19

## 2023-10-26 RX ADMIN — PIPERACILLIN AND TAZOBACTAM 25 GRAM(S): 4; .5 INJECTION, POWDER, LYOPHILIZED, FOR SOLUTION INTRAVENOUS at 09:29

## 2023-10-26 RX ADMIN — Medication 3 MILLILITER(S): at 21:34

## 2023-10-26 RX ADMIN — Medication 100 MILLIGRAM(S): at 05:21

## 2023-10-26 RX ADMIN — Medication 40 MILLIGRAM(S): at 23:32

## 2023-10-26 RX ADMIN — APIXABAN 5 MILLIGRAM(S): 2.5 TABLET, FILM COATED ORAL at 17:21

## 2023-10-26 RX ADMIN — AMLODIPINE BESYLATE 5 MILLIGRAM(S): 2.5 TABLET ORAL at 05:22

## 2023-10-26 RX ADMIN — Medication 5 UNIT(S): at 09:22

## 2023-10-26 RX ADMIN — Medication 7 UNIT(S): at 17:44

## 2023-10-26 RX ADMIN — Medication 5 UNIT(S): at 11:23

## 2023-10-26 RX ADMIN — BUDESONIDE AND FORMOTEROL FUMARATE DIHYDRATE 2 PUFF(S): 160; 4.5 AEROSOL RESPIRATORY (INHALATION) at 09:29

## 2023-10-26 RX ADMIN — Medication 650 MILLIGRAM(S): at 05:21

## 2023-10-26 RX ADMIN — Medication 2: at 09:22

## 2023-10-26 RX ADMIN — CYCLOBENZAPRINE HYDROCHLORIDE 10 MILLIGRAM(S): 10 TABLET, FILM COATED ORAL at 21:31

## 2023-10-26 RX ADMIN — SENNA PLUS 2 TABLET(S): 8.6 TABLET ORAL at 21:30

## 2023-10-26 RX ADMIN — Medication 3 MILLILITER(S): at 10:41

## 2023-10-26 RX ADMIN — Medication 100 MILLIGRAM(S): at 12:08

## 2023-10-26 RX ADMIN — SENNA PLUS 2 TABLET(S): 8.6 TABLET ORAL at 02:53

## 2023-10-26 RX ADMIN — METHOCARBAMOL 750 MILLIGRAM(S): 500 TABLET, FILM COATED ORAL at 17:21

## 2023-10-26 RX ADMIN — Medication 100 MILLIGRAM(S): at 02:54

## 2023-10-26 RX ADMIN — SPIRONOLACTONE 50 MILLIGRAM(S): 25 TABLET, FILM COATED ORAL at 05:43

## 2023-10-26 RX ADMIN — Medication 3 MILLILITER(S): at 05:21

## 2023-10-26 RX ADMIN — APIXABAN 5 MILLIGRAM(S): 2.5 TABLET, FILM COATED ORAL at 05:21

## 2023-10-26 RX ADMIN — TADALAFIL 20 MILLIGRAM(S): 10 TABLET, FILM COATED ORAL at 15:12

## 2023-10-26 NOTE — H&P ADULT - ASSESSMENT
68F with PMHx CHF, COPD on 3L home O2, DM2 on insulin, hx DVT/PE? on eliquis, hx SBO, hx muscle spasms presenting with chest pain, cough and SOB x1 week. Admitted for likely COPD exacerbation and possible HCAP. Also with volume overload on exam consistent with CHF

## 2023-10-26 NOTE — H&P ADULT - PROBLEM SELECTOR PLAN 3
Crackles in RLL, Probnp low but obese, SOB/BAUMAN, CP c/w possible CHF exacerbation. CXR appears to possibly have pulm edema (my read)  -f/u official CXR read  -tele  -keep K>4 and Mg>2  -resume home torsemide 100mg BID  -give metolazone 10mg now. Does not take any longer for months. Used to be on 10mg MWF  -echo  -spironolactone 50mg qd  -on tadalafil? unclear why, possible PAH?  -follows cards [facility]    Chest pain  -trop 14, repeat pending. Has off and on CP. EKG with RBBB with likely repol abnormality  -echo  -cards c/s in AM Crackles in RLL, Probnp low but obese, SOB/BAUMAN, CP c/w possible CHF exacerbation. CXR appears to possibly have pulm edema (my read)  -f/u official CXR read  -tele  -keep K>4 and Mg>2  -resume home torsemide 100mg BID  -give metolazone 10mg now. Does not take any longer for months. Used to be on 10mg MWF  -echo  -spironolactone 50mg qd  -on tadalafil? unclear why, possible PAH?  -follows cards [facility]  -cards c/s in AM    Chest pain  -trop 14, repeat pending. Has off and on CP. EKG with RBBB with likely repol abnormality  -echo  -cards c/s in AM

## 2023-10-26 NOTE — H&P ADULT - NSICDXPASTSURGICALHX_GEN_ALL_CORE_FT
Subjective   Patient ID: Sister Joy is a 82 year old female.  81 Y/O F  WITH PMH OF HTN, CHF, CHR BACK PAIN,  TRIGEMINAL NEURALGIA, H/O SHINGLES, S/p atrial flutter with Mitral isthmus line RFA: 2016, S/p AVN RFA + CRT-P: 2018, long term anticoagulation), Takotsubo cardiomyopathy in 2019 (improved EF on repeat imaging)  Has chronic Sob and dyspnea on exertion- winded.   Angiogram: no cad.  Previously PFT was normal.  Underwent ablation for a.fib.      HERE FOR F/U   Still gets episode of pain in jaw due to trigeminal neuralgia  Under stress   Weight stable   Also C/o lower abd discomfort with urge to have bm x 6 days, and noted some blood on wiping,   Pain is dull ache, pain scale 2 /10. Feels hemorrhoid/ rectal lump x 6 days   Has seen GI DR BOYLE IN 2018, had colonoscopy in 2018  Impression:          - Two 2 to 3 mm polyps at the hepatic flexure and in the   ascending colon, removed with a cold biopsy forceps.   Resected and retrieved.   - Diverticulosis in the entire examined colon.   - Non-bleeding internal hemorrhoids.   Recommendation:      - Patient has a contact number available for   emergencies. The signs and symptoms of potential delayed   complications were discussed with the patient. Return to   normal activities tomorrow. Written discharge   instructions were provided to the patient.   - Repeat colonoscopy in 5 years for surveillance.   - Resume regular diet today.   - Continue pr   esent medications.   - Await pathology results.         Chief Complaint   Patient presents with   • Office Visit   • Abdominal Pain     BOWEL PROBLEM   • Constipation   • Blood Pressure   • Heart Problem     HPI    Patient's medications, allergies, past medical, surgical, social and family histories were reviewed and updated as appropriate.    Review of Systems   All other systems reviewed and are negative.      Objective      Visit Vitals  /62   Pulse 75   Temp 97.2 °F (36.2 °C)   Resp 18   Ht 5' 6\" (1.676 m)   Wt  81.7 kg (180 lb 3.2 oz)   SpO2 97%   BMI 29.09 kg/m²       Physical Exam  Vitals and nursing note reviewed.   Constitutional:       Appearance: She is well-developed.   HENT:      Head: Normocephalic and atraumatic.   Eyes:      Conjunctiva/sclera: Conjunctivae normal.      Pupils: Pupils are equal, round, and reactive to light.   Cardiovascular:      Rate and Rhythm: Normal rate and regular rhythm.      Heart sounds: Normal heart sounds.   Pulmonary:      Effort: Pulmonary effort is normal.      Breath sounds: Normal breath sounds.   Abdominal:      General: Bowel sounds are normal.      Palpations: Abdomen is soft.   Genitourinary:     Comments: Ext hemorrhoids  2-3 superficial ulcers ,   hsv swab done    hemoccult neg     Musculoskeletal:         General: Normal range of motion.      Cervical back: Normal range of motion and neck supple.   Skin:     General: Skin is warm and dry.   Neurological:      Mental Status: She is alert and oriented to person, place, and time.   Psychiatric:         Behavior: Behavior normal.         Thought Content: Thought content normal.         Judgment: Judgment normal.         Assessment         Call if not getting better or getting worse in next few days.  RTC 2-3 WEEKS / PRN   RTC as advised or as needed         Problem List Items Addressed This Visit        Circulatory    Hypertension     Continue present management.             Chronic diastolic congestive heart failure (CMS/HCC)     Monitor: The patient's heart failure is unchanged.  Evaluation: No diagnostic tests required today.  Assessment/Treatment:  Continue current treatment/monitoring regimen.  Condition will be reassessed in 1 month            Other    Herpes zoster without complication      Other Visit Diagnoses     Lower abdominal pain    -  Primary    Relevant Medications    linaclotide (Linzess) 145 MCG capsule    Other Relevant Orders    SERVICE TO GASTROENTEROLOGY    Other hemorrhoids        Relevant Medications     linaclotide (Linzess) 145 MCG capsule    hydroCORTisone (Anusol-HC) 25 MG suppository    lidocaine 5 % rectal cream    Other Relevant Orders    SERVICE TO GASTROENTEROLOGY    Other constipation        Relevant Medications    linaclotide (Linzess) 145 MCG capsule    hydroCORTisone (Anusol-HC) 25 MG suppository    Other Relevant Orders    SERVICE TO GASTROENTEROLOGY    Rectal ulcer        Relevant Orders    HERPES SIMPLEX VIRUS 1 AND 2 BY PCR         [Negative] : Genitourinary PAST SURGICAL HISTORY:  History of colostomy reversal     S/P colostomy     S/p small bowel obstruction

## 2023-10-26 NOTE — PROGRESS NOTE ADULT - PROBLEM SELECTOR PLAN 9
flexeril and methacarbamol PRN Hx of large ventral hernias after abdominal surgery in the past. Recent admission that per pt's report, sounds likely c/f bowel obstruction but was able to have BM.  - Last BM 3 days ago. Passing gas, no abdominal pain  -senna, miralax, lactulose

## 2023-10-26 NOTE — ED ADULT NURSE REASSESSMENT NOTE - NS ED NURSE REASSESS COMMENT FT1
Report given to [facility] RN, Deja. A&Ox4. O2 saturation is 99% on 4L NC. Airway patent, speaking in full and complete sentences. No acute distress noted. NSR on bedside cardiac monitor. Respirations even and unlabored. Bed in lowest position, call bell in hand, wheels locked, safety maintained. ED tech to bring pt to [facility].

## 2023-10-26 NOTE — PROGRESS NOTE ADULT - SUBJECTIVE AND OBJECTIVE BOX
Clint Escobedo MD  Division of Hospitalist Medicine  Pager [phone]  Available on Teams    CC: Patient is a 68y old  Female who presents with a chief complaint of cough, CP, SOB x1 week (26 Oct 2023 01:52)      INTERVAL EVENTS: SAM    SUBJECTIVE / INTERVAL HPI: Patient seen and examined at bedside. Pt reports breathing has improved but not back to baseline. Still has cough but unclear if pt has cough at baseline. No fevers or chills.    ROS: negative unless otherwise stated above.    VITAL SIGNS:  Vital Signs Last 24 Hrs  T(C): 37.1 (26 Oct 2023 11:20), Max: 37.1 (26 Oct 2023 09:39)  T(F): 98.8 (26 Oct 2023 11:20), Max: 98.8 (26 Oct 2023 09:39)  HR: 89 (26 Oct 2023 11:20) (82 - 90)  BP: 134/63 (26 Oct 2023 11:20) (132/77 - 146/88)  BP(mean): --  RR: 18 (26 Oct 2023 11:20) (15 - 18)  SpO2: 95% (26 Oct 2023 11:20) (95% - 98%)    Parameters below as of 26 Oct 2023 11:20  Patient On (Oxygen Delivery Method): nasal cannula  O2 Flow (L/min): 6          PHYSICAL EXAM:    General: NAD  HEENT: MMM  Neck: supple  Cardiovascular: +S1/S2; RRR  Respiratory: CTA B/L; no W/R/R; no respiratory distress, speaking in full sentences on 5L NC  Gastrointestinal: soft, NT/ND  Extremities: WWP; no edema, clubbing or cyanosis  Vascular: palpable radial, DP pulses B/L  Neurological: AAOx3; no focal deficits    MEDICATIONS:  MEDICATIONS  (STANDING):  albuterol/ipratropium for Nebulization 3 milliLiter(s) Nebulizer every 6 hours  amLODIPine   Tablet 5 milliGRAM(s) Oral daily  apixaban 5 milliGRAM(s) Oral two times a day  atorvastatin 80 milliGRAM(s) Oral at bedtime  benzonatate 100 milliGRAM(s) Oral every 8 hours  budesonide 160 MICROgram(s)/formoterol 4.5 MICROgram(s) Inhaler 2 Puff(s) Inhalation two times a day  dextrose 5%. 1000 milliLiter(s) (50 mL/Hr) IV Continuous <Continuous>  dextrose 5%. 1000 milliLiter(s) (100 mL/Hr) IV Continuous <Continuous>  dextrose 50% Injectable 25 Gram(s) IV Push once  dextrose 50% Injectable 12.5 Gram(s) IV Push once  dextrose 50% Injectable 25 Gram(s) IV Push once  DULoxetine 30 milliGRAM(s) Oral daily  FLUoxetine 20 milliGRAM(s) Oral daily  glucagon  Injectable 1 milliGRAM(s) IntraMuscular once  insulin glargine Injectable (LANTUS) 30 Unit(s) SubCutaneous at bedtime  insulin lispro (ADMELOG) corrective regimen sliding scale   SubCutaneous three times a day before meals  insulin lispro (ADMELOG) corrective regimen sliding scale   SubCutaneous at bedtime  insulin lispro Injectable (ADMELOG) 5 Unit(s) SubCutaneous three times a day before meals  piperacillin/tazobactam IVPB.. 3.375 Gram(s) IV Intermittent every 8 hours  polyethylene glycol 3350 17 Gram(s) Oral daily  predniSONE   Tablet 40 milliGRAM(s) Oral daily  senna 2 Tablet(s) Oral at bedtime  spironolactone 50 milliGRAM(s) Oral daily  torsemide 100 milliGRAM(s) Oral two times a day  traZODone 150 milliGRAM(s) Oral at bedtime    MEDICATIONS  (PRN):  acetaminophen     Tablet .. 650 milliGRAM(s) Oral every 6 hours PRN Temp greater or equal to 38C (100.4F), Mild Pain (1 - 3)  cyclobenzaprine 10 milliGRAM(s) Oral three times a day PRN Muscle Spasm  dextrose Oral Gel 15 Gram(s) Oral once PRN Blood Glucose LESS THAN 70 milliGRAM(s)/deciliter  guaiFENesin Oral Liquid (Sugar-Free) 200 milliGRAM(s) Oral every 6 hours PRN Cough  hydrOXYzine hydrochloride 25 milliGRAM(s) Oral two times a day PRN Itching  melatonin 3 milliGRAM(s) Oral at bedtime PRN Insomnia  methocarbamol 750 milliGRAM(s) Oral two times a day PRN Muscle Spasm      ALLERGIES:  Allergies    No Known Allergies    Intolerances        LABS:                        14.2   7.48  )-----------( 223      ( 25 Oct 2023 22:05 )             46.0     10-25    143  |  103  |  24<H>  ----------------------------<  142<H>  4.1   |  31  |  1.49<H>    Ca    9.1      25 Oct 2023 22:05    TPro  7.6  /  Alb  4.0  /  TBili  0.3  /  DBili  x   /  AST  7   /  ALT  7   /  AlkPhos  102  10-25    PT/INR - ( 25 Oct 2023 22:05 )   PT: 12.3 sec;   INR: 1.09 ratio         PTT - ( 25 Oct 2023 22:05 )  PTT:34.2 sec  Urinalysis Basic - ( 25 Oct 2023 22:05 )    Color: x / Appearance: x / SG: x / pH: x  Gluc: 142 mg/dL / Ketone: x  / Bili: x / Urobili: x   Blood: x / Protein: x / Nitrite: x   Leuk Esterase: x / RBC: x / WBC x   Sq Epi: x / Non Sq Epi: x / Bacteria: x      CAPILLARY BLOOD GLUCOSE      POCT Blood Glucose.: 318 mg/dL (26 Oct 2023 11:21)      RADIOLOGY & ADDITIONAL TESTS: Reviewed.

## 2023-10-26 NOTE — H&P ADULT - PROBLEM SELECTOR PLAN 4
On glargine 40u qhs, novolog 7u TID. Will give 75% home dose inpatient  -lantus 30u qhs inpatient  -admelog 5u TID  -A1C 6.4

## 2023-10-26 NOTE — H&P ADULT - NSHPREVIEWOFSYSTEMS_GEN_ALL_CORE
10
ROS:    Constitutional: [ ] fevers [ x] chills   HEENT: [ ] postnasal drip [x ] nasal congestion  CV: [x ] chest pain [x ] orthopnea [ ] palpitations [ ] murmur  Resp: [ x] cough [x ] shortness of breath [x ] dyspnea [x ] wheezing [ ] sputum   GI: [ ] nausea [ ] vomiting [ ] diarrhea [x ] constipation [ ] abd pain   : [ ] dysuria [ ] nocturia [ ] hematuria [ ] increased urinary frequency  Musculoskeletal: [ ] back pain [ ] myalgias [ ] arthralgias   Skin: [ ] rash [ ] itch  Neurological: [ ] headache [ ] dizziness [ ] syncope   Endocrine: [ ] diabetes [ ] thyroid problem  Hematologic/Lymphatic: [ ] anemia [ ] bleeding problem  [x ] All other systems negative

## 2023-10-26 NOTE — PROGRESS NOTE ADULT - ASSESSMENT
68F with PMHx CHF, COPD on 3L home O2, DM2 on insulin, hx DVT/PE? on eliquis, hx SBO, hx muscle spasms presenting with chest pain, cough and SOB x1 week a/f COPD exacerbation.

## 2023-10-26 NOTE — PROGRESS NOTE ADULT - PROBLEM SELECTOR PLAN 7
Patient believes she may have had hx of DVT but is unsure. She is on eliquis but is not sure specifically why  -resume eliquis as pt high risk for recurrence given poor functional status On glargine 40u qhs, novolog 7u TID, jardiance 10, trulicity  -A1C 6.4  Plan:  - increase to lantus 33u, lispro 7u  - will consider restarting jardiance pending Cr

## 2023-10-26 NOTE — H&P ADULT - PROBLEM SELECTOR PLAN 1
+Wheezing, green phlegm with cough, SOB c/w with COPD exacerbation. CO2 69, not acidemic  -duonebs  -s/p dex, pred 40mg qd   -zosyn  -needs pulm f/u. If does not improve, pulm c/s inpatient  -symbicort inpatient

## 2023-10-26 NOTE — H&P ADULT - HISTORY OF PRESENT ILLNESS
68F with PMHx CHF, COPD on 3L home O2, DM2 on insulin, hx DVT/PE? on eliquis, hx SBO, hx muscle spasms presenting with chest pain, cough and SOB x1 week. The patient was admitted for abdominal pain 2/2 constipation per her history at [facility] ~10/11-10/14. She had small stools and was discharged. She was concerned at the time that this could be SBO as she had this in the past. Pain is improved but she is still constipated with last BM 3 days ago. She uses miralax at home. Over the last week she has been having SOB and cough productive of green phlegm. +chills, +PND, +rhinorrhea and sore throat. She endorses crushing chest pain over several weeks as well associated with SOB. She also notes LE edema but unclear over what time span. She stopped her metolazone she was previously taking 10mg MWF several months ago thinking she no longer needed. She is compliant with her torsemide 100mg BID. She feels as though her legs are heavier as if when she has CHF exacerbations.    Cards: [facility], does not know name  Pulm: Dr. Recio?

## 2023-10-26 NOTE — H&P ADULT - PROBLEM SELECTOR PLAN 5
Patient believes she may have had hx of DVT but is unsure. She is on eliquis but is not sure specifically why  -resume eliquis for now  -collateral from outpatient cards

## 2023-10-26 NOTE — PROGRESS NOTE ADULT - PROBLEM SELECTOR PLAN 1
Hx of COPD on 3L home O2. Follows with Dr. Tracy. SOB after walking <1 block at baseline. On albuterol prn. Not adherent to home CPAP for WALTER. Reports ?2 hospitalizations for COPD, 1 in 2019 and 1 last week, but unclear if actually for COPD as pt reports she was there for abdominal pain from ventral hernias. Now presented with wheezing, green phlegm with cough, SOB, requiring 6L in ED.  - RVP/COVID neg  - CO2 69, not acidemic  - CXR with emphysema but no acute infiltrate or pulmonary edema noted  -   Plan:  - presentation c/w with COPD exacerbation, likely in the setting of minimal medication/CPAP adherence. will treat for pneumonia as pt admitted with COPD exacerbation. however, will only treat for CAP as it is unclear if pt received abx at recent admission and low c/f severe pneumonia at this time given lack of leukocytosis or fever. less likely CHF exacerbation as pt does not appear fluid overloaded with low age-adjusted BNP  - s/p dex, will continue pred 40mg qd   - duonebs q6 standing, will switch to tiotropium on d/c  - switch abx to ceftriaxone and azithromycin  - started on symbicort  - f/u pulm recs  - f/u TTE Hx of COPD on 3L home O2. Follows with Dr. Tracy. SOB after walking <1 block at baseline. On albuterol prn. Not adherent to home CPAP for WALTER. Reports ?2 hospitalizations for COPD, 1 in 2019 and 1 last week, but unclear if actually for COPD as pt reports she was there for abdominal pain from ventral hernias. Now presented with wheezing, green phlegm with cough, SOB, requiring 6L in ED.  - RVP/COVID neg  - CO2 69, not acidemic  - CXR with emphysema but no acute infiltrate or pulmonary edema noted  -   Plan:  - presentation c/w with COPD exacerbation, likely in the setting of minimal medication/CPAP adherence. will treat for pneumonia as pt admitted with COPD exacerbation. however, will only treat for CAP as it is unclear if pt received abx at recent admission and low c/f severe pneumonia at this time given lack of leukocytosis or fever. less likely CHF exacerbation as pt does not appear fluid overloaded with low age-adjusted BNP  - s/p dex, will continue pred 40mg qd   - duonebs q6 standing, will switch to tiotropium on d/c  - switch abx to ceftriaxone and azithromycin  - started on symbicort  - f/u TTE Hx of pHTN at home 2L O2 at rest, 3L with activity. Follows with Dr. Recio at [facility]. SOB after walking <1 block at baseline. On albuterol prn for COPD and tadalafil for pHTN. Not adherent to home Bipap 15/5 for WALTER. Reports ?2 hospitalizations for "breathing", 1 in 2019 and 1 last week, but unclear if actually for COPD as pt poor historian and reports she was there for different complaints as well (eg abdominal pain from ventral hernias). Now presented with wheezing, green phlegm with cough, SOB, requiring 6L in ED.  - RVP/COVID neg  - CO2 69, not acidemic  - CXR with emphysema but no acute infiltrate or pulmonary edema noted  -   Plan:  - presentation c/w with COPD exacerbation, likely in the setting of minimal medication/CPAP adherence. will treat for pneumonia as pt admitted with COPD exacerbation. however, will only treat for CAP as it is unclear if pt received abx at recent admission and low c/f severe pneumonia at this time given lack of leukocytosis or fever. less likely CHF exacerbation as pt does not appear fluid overloaded with low age-adjusted BNP  - s/p dex, will continue pred 40mg qd   - duonebs q6 standing, will switch to tiotropium on d/c  - switch abx to ceftriaxone and azithromycin  - started on symbicort  - f/u TTE  - f/u pulm consult  - f/u CT chest Hx of pHTN at home 2L O2 at rest, 3L with activity. Follows with Dr. Recio at [facility]. SOB after walking <1 block at baseline. On albuterol prn for COPD and tadalafil for pHTN. Not adherent to home Bipap 15/5 for WALTER. Reports ?2 hospitalizations for "breathing", 1 in 2019 and 1 last week, but unclear if actually for COPD as pt poor historian and reports she was there for different complaints as well (eg abdominal pain from ventral hernias). Now presented with wheezing, green phlegm with cough, SOB, requiring 6L in ED.  - discussed with Dr. Recio, pt very poorly adherent to medications and office visits. PASP 1.5 years ago 72. He is agreement to treat for COPD exacerbation, start on COPD meds and f/u o/p when stable for d/c  - RVP/COVID neg  - CO2 69, not acidemic  - CXR with emphysema but no acute infiltrate or pulmonary edema noted  -   Plan:  - presentation c/w with COPD exacerbation, likely in the setting of minimal medication/CPAP adherence. will treat for pneumonia as pt admitted with COPD exacerbation. however, will only treat for CAP as it is unclear if pt received abx at recent admission and low c/f severe pneumonia at this time given lack of leukocytosis or fever. less likely CHF exacerbation as pt does not appear fluid overloaded with low age-adjusted BNP  - s/p dex, will continue pred 40mg qd   - duonebs q6 standing, will switch to tiotropium on d/c  - switch abx to ceftriaxone and azithromycin  - started on symbicort  - f/u TTE  - f/u CT chest

## 2023-10-26 NOTE — H&P ADULT - PROBLEM SELECTOR PLAN 2
?RLL PNA on CXR, crackles in RLL, cough, chills, SOB. Possible HCAP given recent hospitalization  -zosyn  -add on procalcitonin  -RVP/COVID neg  -vanc by level for HCAP, check random level 1030 10/26  -f/u CXR read, consider CT chest to better evaluate depending on read

## 2023-10-26 NOTE — PROGRESS NOTE ADULT - PROBLEM SELECTOR PLAN 8
Hx of large ventral hernias after abdominal surgery in the past. Recent admission that per pt's report, sounds likely c/f bowel obstruction but was able to have BM.  - Last BM 3 days ago. Passing gas, no abdominal pain  -senna, miralax, lactulose Patient believes she may have had hx of DVT but is unsure. She is on eliquis but is not sure specifically why  -resume eliquis as pt high risk for recurrence given poor functional status

## 2023-10-26 NOTE — PROGRESS NOTE ADULT - PROBLEM SELECTOR PLAN 5
- as above - as above, does not appear to be in acute exacerbation  - s/p metolazone 10mg x1 in ED. Does not take any longer for months. Used to be on 10mg MWF.  - follows cards [facility]  Plan:  - resume home torsemide 100mg BID, иван 25mg BID  - f/u TTE  - on tadalafil? unclear why, possible PAH?, will f/u TTE  - likely cards consult pending TTE and clinical improvement

## 2023-10-26 NOTE — H&P ADULT - NSHPPHYSICALEXAM_GEN_ALL_CORE
PHYSICAL EXAM:  Vital Signs Last 24 Hrs  T(C): 36.4 (10-26-23 @ 00:04)  T(F): 97.6 (10-26-23 @ 00:04), Max: 98.2 (10-25-23 @ 20:42)  HR: 90 (10-26-23 @ 00:04) (82 - 90)  BP: 134/65 (10-26-23 @ 00:04)  BP(mean): --  RR: 18 (10-26-23 @ 00:04) (15 - 18)  SpO2: 95% (10-26-23 @ 00:04) (95% - 98%)  Wt(kg): --    Constitutional: NAD, awake and alert, well developed  EYES: EOMI, conjunctiva clear  ENT:  Normal Hearing, no tonsillar exudates   Neck: Soft and supple , no thyromegaly   Respiratory: Crackles in RLL, frequent coughing with deep inhalation and wheezing noted throughout, no tachypnea, no accessory muscle use. Breathing comfortably  Cardiovascular: S1 and S2, regular rate and rhythm, no Murmurs, gallops or rubs, no JVD, 2+ leg edema bilaterally  Gastrointestinal: Bowel Sounds present, soft, nontender, nondistended, no guarding, no rebound. Reducible hernia - no pain  Extremities: No cyanosis or clubbing; warm to touch  Neurological: No focal deficits, CN II-XII intact bilaterally, sensation to light touch intact in all extremities  Musculoskeletal: 5/5 strength b/l upper and lower extremities; no joint swelling.  Skin: No rashes, no ulcerations

## 2023-10-26 NOTE — CONSULT NOTE ADULT - ASSESSMENT
68F with PMHx CHF, COPD on 3L home O2, DM2 on insulin, hx DVT/PE? on eliquis, hx SBO, hx muscle spasms presenting with chest pain, cough and SOB x1 week with concern for  COPD exacerbation vs HCAP.       **note not finalized until attested with attending**     #Hypoxia  #COPD Exacerbation  #CAP    -several days of increasing cough, SOB, no change in sputum color or consistency, current sx appeared to be triggered by viral vs bacterial process leading to COPD exacerbation  - CXR with b/l pulm opacities consistent with emphysema, given hx of subjective fevers and recent hospitalization can continue treatment w abx   - VBG wnl, pCO2 64, pt does not look toxic appearing, sating 92% on room air  - can wean O2 to baseline 3L as tolerated   - pending official CT chest read   - continue Duoneb Symbicort while inpatient   - can utilize Aerobika for further airway clearance   - pt likely has underlying WALTER, can utilize nocturnal CPAP  68F with PMHx CHF, COPD on 3L home O2, DM2 on insulin, hx DVT/PE? on eliquis, hx SBO, hx muscle spasms presenting with chest pain, cough and SOB x1 week with concern for  COPD exacerbation vs HCAP.       **note not finalized until attested with attending**     #Hypoxia  #COPD Exacerbation  #CAP    -several days of increasing cough, SOB, no change in sputum color or consistency, current sx appeared to be triggered by likely viral etiology leading to COPD exacerbation  - CXR with b/l pulm opacities consistent with emphysema, hx of subjective fevers at home however afebrile inpatient, vitals wnl less likely concern for bacterial process   - VBG wnl, pCO2 64, pt does not look toxic appearing, sating 92% on room air  - can wean O2 to baseline 3L as tolerated   - pending official CT chest read   - continue Duoneb Symbicort while inpatient   - can utilize Aerobika for further airway clearance   - pt likely has underlying WALTER, can utilize nocturnal CPAP  68F with PMHx CHF, COPD on 3L home O2, DM2 on insulin, hx DVT/PE? on eliquis, hx SBO, hx muscle spasms presenting with chest pain, cough and SOB x1 week with concern for  COPD exacerbation vs HCAP.       **note not finalized until attested with attending**     #Hypoxia  #COPD Exacerbation  #CAP    -several days of increasing cough, SOB, no change in sputum color or consistency, current sx appeared to be triggered by likely bacterial vs viral process leading to COPD exacerbation  - CXR with RLL opacity, hx of subjective fevers at home however afebrile inpatient, can continue treatment for CAP with abx   - VBG wnl, pCO2 64, pt does not look toxic appearing, sating 92% on room air  - can wean O2 to baseline 3L as tolerated, maintain O2 >90%, add humidifier given nasal bleeding  - pending CT chest   - continue Duoneb and prednisone while inpatient, will likely need maintenance LAMA on discharge  - can utilize Aerobika for further airway clearance   - pt likely has underlying WALTER, can utilize nocturnal CPAP   - nicotine patch given current smoking hx   68F with PMHx CHF, COPD on 3L home O2, DM2 on insulin, hx DVT/PE? on eliquis, hx SBO, hx muscle spasms presenting with chest pain, cough and SOB x1 week with concern for  COPD exacerbation vs HCAP.       **note not finalized until attested with attending**     #Hypoxia  #COPD Exacerbation  #CAP    -several days of increasing cough, SOB, no change in sputum color or consistency, current sx appeared to be triggered by likely bacterial vs viral process leading to COPD exacerbation  - CXR with RLL opacity, hx of subjective fevers at home however afebrile inpatient, can continue treatment for CAP with abx   - VBG wnl, pCO2 64, pt does not look toxic appearing, sating 92% on room air  - can wean O2 to baseline 3L as tolerated, maintain O2 >90%, add humidifier given nasal bleeding  - pending CT chest   - continue Duoneb and prednisone while inpatient, will likely need maintenance LAMA on discharge  - can utilize Aerobika for further airway clearance   - nicotine patch given current smoking hx

## 2023-10-26 NOTE — H&P ADULT - NSHPLABSRESULTS_GEN_ALL_CORE
Personally reviewed labs:                        14.2   7.48  )-----------( 223      ( 25 Oct 2023 22:05 )             46.0     10-25-23 @ 22:05    143  |  103  |  24<H>             --------------------------< 142<H>     4.1  |  31  | 1.49<H>    eGFR AA: --  eGFR N-AA: --    Calcium: 9.1  Phosphorus: --  Magnesium: --    AST: 7    ALT: 7  AlkPhos: 102  Protein: 7.6  Albumin: 4.0  TBili: 0.3  D-Bili: --    VBG - ( 25 Oct 2023 22:05 )  pH: 7.35  /  pCO2: 64    /  pO2: 31    / HCO3: 35    / Base Excess: 7.2   /  SvO2: 52.2  / Lactate: 0.9              RADIOLOGY & ADDITIONAL TESTS:    EKG my independent interpretation: NSR, RBBB, TWI in III and lateral leads possible repol abnormality with RBBB    CXR my independent interpretation: RLL haziness/opacity possibly consistent with PNA. appears to have increased pulmonary vascular congestion c/w pulmonary edema as well    Pending official radiology read

## 2023-10-26 NOTE — H&P ADULT - PROBLEM SELECTOR PLAN 6
Eliquis 5mg BID  DASH/TLC CC fluid restriction Last BM 3 days ago. Passing gas, no abdominal pain  -senna, miralax

## 2023-10-26 NOTE — PROGRESS NOTE ADULT - PROBLEM SELECTOR PROBLEM 7
History of deep vein thrombosis Type 2 diabetes mellitus with hyperglycemia, with long-term current use of insulin

## 2023-10-26 NOTE — PROGRESS NOTE ADULT - PROBLEM SELECTOR PLAN 4
- as above, does not appear to be in acute exacerbation  - s/p metolazone 10mg x1 in ED. Does not take any longer for months. Used to be on 10mg MWF.  - follows cards [facility]  Plan:  - resume home torsemide 100mg BID, иван 25mg BID  - f/u TTE  - on tadalafil? unclear why, possible PAH?, will f/u TTE  - likely cards consult pending TTE and clinical improvement - as above

## 2023-10-26 NOTE — PROGRESS NOTE ADULT - PROBLEM SELECTOR PLAN 6
On glargine 40u qhs, novolog 7u TID, jardiance 10, trulicity  -A1C 6.4  Plan:  - increase to lantus 33u, lispro 7u  - will consider restarting jardiance pending Cr - as above

## 2023-10-26 NOTE — CONSULT NOTE ADULT - ATTENDING COMMENTS
68F activer smoker, COPD, chronic hypoxemic respiratory failure on home oxygen 3LPM who presents with shortness of breath in setting of COPD exacerbation 68F activer smoker, COPD, pulmonary hypertension (mehranley Group II and III on Tadalafil) chronic hypoxemic respiratory failure on home oxygen 3LPM who presents with shortness of breath in setting of COPD exacerbation.  CT chest reviewed - no significant infiltrates, but bibasilar atelectasis noted.  Can d/c Ceftriaxone. Can complete 5 day course of Azithromycin.   Continue Prednisone 40 mg.  Duoneb Q6H with Aerobika  Please humidify oxygen.   Should eventually be discharged on maintenance LAMA.  Please order nicotine patch.  Check TTE.

## 2023-10-27 LAB
ANION GAP SERPL CALC-SCNC: 11 MMOL/L — SIGNIFICANT CHANGE UP (ref 7–14)
ANION GAP SERPL CALC-SCNC: 11 MMOL/L — SIGNIFICANT CHANGE UP (ref 7–14)
BILIRUB SERPL-MCNC: 0.4 MG/DL — SIGNIFICANT CHANGE UP (ref 0.2–1.2)
BILIRUB SERPL-MCNC: 0.4 MG/DL — SIGNIFICANT CHANGE UP (ref 0.2–1.2)
BUN SERPL-MCNC: 45 MG/DL — HIGH (ref 7–23)
BUN SERPL-MCNC: 45 MG/DL — HIGH (ref 7–23)
CALCIUM SERPL-MCNC: 9.9 MG/DL — SIGNIFICANT CHANGE UP (ref 8.4–10.5)
CALCIUM SERPL-MCNC: 9.9 MG/DL — SIGNIFICANT CHANGE UP (ref 8.4–10.5)
CHLORIDE SERPL-SCNC: 94 MMOL/L — LOW (ref 98–107)
CHLORIDE SERPL-SCNC: 94 MMOL/L — LOW (ref 98–107)
CO2 SERPL-SCNC: 29 MMOL/L — SIGNIFICANT CHANGE UP (ref 22–31)
CO2 SERPL-SCNC: 29 MMOL/L — SIGNIFICANT CHANGE UP (ref 22–31)
CREAT SERPL-MCNC: 1.56 MG/DL — HIGH (ref 0.5–1.3)
CREAT SERPL-MCNC: 1.56 MG/DL — HIGH (ref 0.5–1.3)
EGFR: 36 ML/MIN/1.73M2 — LOW
EGFR: 36 ML/MIN/1.73M2 — LOW
GLUCOSE BLDC GLUCOMTR-MCNC: 276 MG/DL — HIGH (ref 70–99)
GLUCOSE BLDC GLUCOMTR-MCNC: 276 MG/DL — HIGH (ref 70–99)
GLUCOSE BLDC GLUCOMTR-MCNC: 333 MG/DL — HIGH (ref 70–99)
GLUCOSE BLDC GLUCOMTR-MCNC: 333 MG/DL — HIGH (ref 70–99)
GLUCOSE BLDC GLUCOMTR-MCNC: 342 MG/DL — HIGH (ref 70–99)
GLUCOSE BLDC GLUCOMTR-MCNC: 342 MG/DL — HIGH (ref 70–99)
GLUCOSE BLDC GLUCOMTR-MCNC: 345 MG/DL — HIGH (ref 70–99)
GLUCOSE BLDC GLUCOMTR-MCNC: 345 MG/DL — HIGH (ref 70–99)
GLUCOSE SERPL-MCNC: 386 MG/DL — HIGH (ref 70–99)
GLUCOSE SERPL-MCNC: 386 MG/DL — HIGH (ref 70–99)
HCT VFR BLD CALC: 45.3 % — HIGH (ref 34.5–45)
HCT VFR BLD CALC: 45.3 % — HIGH (ref 34.5–45)
HCV AB S/CO SERPL IA: 2.17 S/CO — HIGH (ref 0–0.99)
HCV AB S/CO SERPL IA: 2.17 S/CO — HIGH (ref 0–0.99)
HCV AB SERPL-IMP: ABNORMAL
HCV AB SERPL-IMP: ABNORMAL
HCV RNA FLD QL NAA+PROBE: SIGNIFICANT CHANGE UP
HCV RNA FLD QL NAA+PROBE: SIGNIFICANT CHANGE UP
HCV RNA SPEC QL PROBE+SIG AMP: SIGNIFICANT CHANGE UP
HCV RNA SPEC QL PROBE+SIG AMP: SIGNIFICANT CHANGE UP
HGB BLD-MCNC: 14.7 G/DL — SIGNIFICANT CHANGE UP (ref 11.5–15.5)
HGB BLD-MCNC: 14.7 G/DL — SIGNIFICANT CHANGE UP (ref 11.5–15.5)
INR BLD: 1.04 RATIO — SIGNIFICANT CHANGE UP (ref 0.85–1.18)
INR BLD: 1.04 RATIO — SIGNIFICANT CHANGE UP (ref 0.85–1.18)
MAGNESIUM SERPL-MCNC: 2.5 MG/DL — SIGNIFICANT CHANGE UP (ref 1.6–2.6)
MAGNESIUM SERPL-MCNC: 2.5 MG/DL — SIGNIFICANT CHANGE UP (ref 1.6–2.6)
MCHC RBC-ENTMCNC: 30 PG — SIGNIFICANT CHANGE UP (ref 27–34)
MCHC RBC-ENTMCNC: 30 PG — SIGNIFICANT CHANGE UP (ref 27–34)
MCHC RBC-ENTMCNC: 32.5 GM/DL — SIGNIFICANT CHANGE UP (ref 32–36)
MCHC RBC-ENTMCNC: 32.5 GM/DL — SIGNIFICANT CHANGE UP (ref 32–36)
MCV RBC AUTO: 92.4 FL — SIGNIFICANT CHANGE UP (ref 80–100)
MCV RBC AUTO: 92.4 FL — SIGNIFICANT CHANGE UP (ref 80–100)
MELD SCORE WITH DIALYSIS: 22 POINTS — SIGNIFICANT CHANGE UP
MELD SCORE WITH DIALYSIS: 22 POINTS — SIGNIFICANT CHANGE UP
MELD SCORE WITHOUT DIALYSIS: 11 POINTS — SIGNIFICANT CHANGE UP
MELD SCORE WITHOUT DIALYSIS: 11 POINTS — SIGNIFICANT CHANGE UP
NRBC # BLD: 0 /100 WBCS — SIGNIFICANT CHANGE UP (ref 0–0)
NRBC # BLD: 0 /100 WBCS — SIGNIFICANT CHANGE UP (ref 0–0)
NRBC # FLD: 0 K/UL — SIGNIFICANT CHANGE UP (ref 0–0)
NRBC # FLD: 0 K/UL — SIGNIFICANT CHANGE UP (ref 0–0)
PHOSPHATE SERPL-MCNC: 4.5 MG/DL — SIGNIFICANT CHANGE UP (ref 2.5–4.5)
PHOSPHATE SERPL-MCNC: 4.5 MG/DL — SIGNIFICANT CHANGE UP (ref 2.5–4.5)
PLATELET # BLD AUTO: 220 K/UL — SIGNIFICANT CHANGE UP (ref 150–400)
PLATELET # BLD AUTO: 220 K/UL — SIGNIFICANT CHANGE UP (ref 150–400)
POTASSIUM SERPL-MCNC: 4.9 MMOL/L — SIGNIFICANT CHANGE UP (ref 3.5–5.3)
POTASSIUM SERPL-MCNC: 4.9 MMOL/L — SIGNIFICANT CHANGE UP (ref 3.5–5.3)
POTASSIUM SERPL-SCNC: 4.9 MMOL/L — SIGNIFICANT CHANGE UP (ref 3.5–5.3)
POTASSIUM SERPL-SCNC: 4.9 MMOL/L — SIGNIFICANT CHANGE UP (ref 3.5–5.3)
PROTHROM AB SERPL-ACNC: 11.7 SEC — SIGNIFICANT CHANGE UP (ref 9.5–13)
PROTHROM AB SERPL-ACNC: 11.7 SEC — SIGNIFICANT CHANGE UP (ref 9.5–13)
RBC # BLD: 4.9 M/UL — SIGNIFICANT CHANGE UP (ref 3.8–5.2)
RBC # BLD: 4.9 M/UL — SIGNIFICANT CHANGE UP (ref 3.8–5.2)
RBC # FLD: 13.8 % — SIGNIFICANT CHANGE UP (ref 10.3–14.5)
RBC # FLD: 13.8 % — SIGNIFICANT CHANGE UP (ref 10.3–14.5)
S PNEUM AG UR QL: NEGATIVE — SIGNIFICANT CHANGE UP
S PNEUM AG UR QL: NEGATIVE — SIGNIFICANT CHANGE UP
SODIUM SERPL-SCNC: 134 MMOL/L — LOW (ref 135–145)
SODIUM SERPL-SCNC: 134 MMOL/L — LOW (ref 135–145)
WBC # BLD: 10.49 K/UL — SIGNIFICANT CHANGE UP (ref 3.8–10.5)
WBC # BLD: 10.49 K/UL — SIGNIFICANT CHANGE UP (ref 3.8–10.5)
WBC # FLD AUTO: 10.49 K/UL — SIGNIFICANT CHANGE UP (ref 3.8–10.5)
WBC # FLD AUTO: 10.49 K/UL — SIGNIFICANT CHANGE UP (ref 3.8–10.5)

## 2023-10-27 PROCEDURE — 99233 SBSQ HOSP IP/OBS HIGH 50: CPT

## 2023-10-27 PROCEDURE — 93306 TTE W/DOPPLER COMPLETE: CPT | Mod: 26

## 2023-10-27 RX ORDER — PSYLLIUM SEED (WITH DEXTROSE)
1 POWDER (GRAM) ORAL DAILY
Refills: 0 | Status: DISCONTINUED | OUTPATIENT
Start: 2023-10-27 | End: 2023-11-01

## 2023-10-27 RX ORDER — INSULIN LISPRO 100/ML
9 VIAL (ML) SUBCUTANEOUS
Refills: 0 | Status: DISCONTINUED | OUTPATIENT
Start: 2023-10-27 | End: 2023-10-28

## 2023-10-27 RX ORDER — INSULIN GLARGINE 100 [IU]/ML
37 INJECTION, SOLUTION SUBCUTANEOUS AT BEDTIME
Refills: 0 | Status: DISCONTINUED | OUTPATIENT
Start: 2023-10-27 | End: 2023-10-28

## 2023-10-27 RX ORDER — METHOCARBAMOL 500 MG/1
750 TABLET, FILM COATED ORAL THREE TIMES A DAY
Refills: 0 | Status: DISCONTINUED | OUTPATIENT
Start: 2023-10-27 | End: 2023-11-01

## 2023-10-27 RX ADMIN — Medication 200 MILLIGRAM(S): at 02:13

## 2023-10-27 RX ADMIN — Medication 4: at 12:49

## 2023-10-27 RX ADMIN — BUDESONIDE AND FORMOTEROL FUMARATE DIHYDRATE 2 PUFF(S): 160; 4.5 AEROSOL RESPIRATORY (INHALATION) at 08:58

## 2023-10-27 RX ADMIN — LACTULOSE 10 GRAM(S): 10 SOLUTION ORAL at 06:42

## 2023-10-27 RX ADMIN — LACTULOSE 10 GRAM(S): 10 SOLUTION ORAL at 17:41

## 2023-10-27 RX ADMIN — Medication 100 MILLIGRAM(S): at 13:23

## 2023-10-27 RX ADMIN — Medication 100 MILLIGRAM(S): at 06:41

## 2023-10-27 RX ADMIN — POLYETHYLENE GLYCOL 3350 17 GRAM(S): 17 POWDER, FOR SOLUTION ORAL at 12:50

## 2023-10-27 RX ADMIN — Medication 3 MILLILITER(S): at 16:56

## 2023-10-27 RX ADMIN — APIXABAN 5 MILLIGRAM(S): 2.5 TABLET, FILM COATED ORAL at 17:40

## 2023-10-27 RX ADMIN — AZITHROMYCIN 500 MILLIGRAM(S): 500 TABLET, FILM COATED ORAL at 17:40

## 2023-10-27 RX ADMIN — Medication 100 MILLIGRAM(S): at 14:16

## 2023-10-27 RX ADMIN — Medication 25 MILLIGRAM(S): at 02:13

## 2023-10-27 RX ADMIN — Medication 1: at 21:40

## 2023-10-27 RX ADMIN — Medication 3 MILLILITER(S): at 04:16

## 2023-10-27 RX ADMIN — AMLODIPINE BESYLATE 5 MILLIGRAM(S): 2.5 TABLET ORAL at 06:42

## 2023-10-27 RX ADMIN — Medication 20 MILLIGRAM(S): at 12:50

## 2023-10-27 RX ADMIN — Medication 3 MILLILITER(S): at 10:35

## 2023-10-27 RX ADMIN — Medication 9 UNIT(S): at 12:50

## 2023-10-27 RX ADMIN — Medication 4: at 08:57

## 2023-10-27 RX ADMIN — SENNA PLUS 2 TABLET(S): 8.6 TABLET ORAL at 21:37

## 2023-10-27 RX ADMIN — Medication 3 MILLILITER(S): at 21:31

## 2023-10-27 RX ADMIN — ATORVASTATIN CALCIUM 80 MILLIGRAM(S): 80 TABLET, FILM COATED ORAL at 21:31

## 2023-10-27 RX ADMIN — Medication 4: at 17:45

## 2023-10-27 RX ADMIN — INSULIN GLARGINE 37 UNIT(S): 100 INJECTION, SOLUTION SUBCUTANEOUS at 21:46

## 2023-10-27 RX ADMIN — APIXABAN 5 MILLIGRAM(S): 2.5 TABLET, FILM COATED ORAL at 06:42

## 2023-10-27 RX ADMIN — SPIRONOLACTONE 25 MILLIGRAM(S): 25 TABLET, FILM COATED ORAL at 06:42

## 2023-10-27 RX ADMIN — LACTULOSE 10 GRAM(S): 10 SOLUTION ORAL at 00:41

## 2023-10-27 RX ADMIN — Medication 100 MILLIGRAM(S): at 21:31

## 2023-10-27 RX ADMIN — Medication 7 UNIT(S): at 08:57

## 2023-10-27 RX ADMIN — TADALAFIL 20 MILLIGRAM(S): 10 TABLET, FILM COATED ORAL at 14:16

## 2023-10-27 RX ADMIN — Medication 40 MILLIGRAM(S): at 06:41

## 2023-10-27 RX ADMIN — Medication 9 UNIT(S): at 17:47

## 2023-10-27 RX ADMIN — Medication 150 MILLIGRAM(S): at 21:31

## 2023-10-27 RX ADMIN — Medication 100 MILLIGRAM(S): at 06:42

## 2023-10-27 RX ADMIN — DULOXETINE HYDROCHLORIDE 30 MILLIGRAM(S): 30 CAPSULE, DELAYED RELEASE ORAL at 12:50

## 2023-10-27 RX ADMIN — Medication 3 MILLIGRAM(S): at 21:31

## 2023-10-27 RX ADMIN — BUDESONIDE AND FORMOTEROL FUMARATE DIHYDRATE 2 PUFF(S): 160; 4.5 AEROSOL RESPIRATORY (INHALATION) at 21:37

## 2023-10-27 RX ADMIN — SPIRONOLACTONE 25 MILLIGRAM(S): 25 TABLET, FILM COATED ORAL at 17:40

## 2023-10-27 NOTE — PROGRESS NOTE ADULT - PROBLEM SELECTOR PLAN 8
CHEST PAIN Hx of large ventral hernias after abdominal surgery in the past. Recent admission that per pt's report, sounds likely c/f bowel obstruction but was able to have BM.  -Passing gas, no abdominal pain  -senna, miralax, lactulose, psyllium

## 2023-10-27 NOTE — PROGRESS NOTE ADULT - SUBJECTIVE AND OBJECTIVE BOX
PROGRESS NOTE:   Authored by Dr. Zhane Lopez MD (PGY-1). Pager [facility] [phone] / [place] ( [phone]) or via TEAMS    Patient is a 68y old  Female who presents with a chief complaint of cough, CP, SOB x1 week (26 Oct 2023 14:04)      SUBJECTIVE / OVERNIGHT EVENTS:  No acute events overnight. Pt on room air sitting comfortably this AM. Notes SOB is similar to prior, increased coughing overnight. Used aerobika device. Denies fevers, chills, CP however does not worsening leg pain and stiffness b/l 'feels like rods'.       MEDICATIONS  (STANDING):  albuterol/ipratropium for Nebulization 3 milliLiter(s) Nebulizer every 6 hours  amLODIPine   Tablet 5 milliGRAM(s) Oral daily  apixaban 5 milliGRAM(s) Oral two times a day  atorvastatin 80 milliGRAM(s) Oral at bedtime  azithromycin   Tablet 500 milliGRAM(s) Oral every 24 hours  benzonatate 100 milliGRAM(s) Oral every 8 hours  budesonide 160 MICROgram(s)/formoterol 4.5 MICROgram(s) Inhaler 2 Puff(s) Inhalation two times a day  cefTRIAXone   IVPB 1000 milliGRAM(s) IV Intermittent every 24 hours  dextrose 5%. 1000 milliLiter(s) (50 mL/Hr) IV Continuous <Continuous>  dextrose 5%. 1000 milliLiter(s) (100 mL/Hr) IV Continuous <Continuous>  dextrose 50% Injectable 25 Gram(s) IV Push once  dextrose 50% Injectable 12.5 Gram(s) IV Push once  dextrose 50% Injectable 25 Gram(s) IV Push once  DULoxetine 30 milliGRAM(s) Oral daily  FLUoxetine 20 milliGRAM(s) Oral daily  glucagon  Injectable 1 milliGRAM(s) IntraMuscular once  insulin glargine Injectable (LANTUS) 37 Unit(s) SubCutaneous at bedtime  insulin lispro (ADMELOG) corrective regimen sliding scale   SubCutaneous three times a day before meals  insulin lispro (ADMELOG) corrective regimen sliding scale   SubCutaneous at bedtime  insulin lispro Injectable (ADMELOG) 9 Unit(s) SubCutaneous three times a day before meals  lactulose Syrup 10 Gram(s) Oral two times a day  polyethylene glycol 3350 17 Gram(s) Oral daily  predniSONE   Tablet 40 milliGRAM(s) Oral daily  senna 2 Tablet(s) Oral at bedtime  spironolactone 25 milliGRAM(s) Oral two times a day  tadalafil 20 milliGRAM(s) Oral daily  torsemide 100 milliGRAM(s) Oral two times a day  traZODone 150 milliGRAM(s) Oral at bedtime    MEDICATIONS  (PRN):  acetaminophen     Tablet .. 650 milliGRAM(s) Oral every 6 hours PRN Temp greater or equal to 38C (100.4F), Mild Pain (1 - 3)  cyclobenzaprine 10 milliGRAM(s) Oral three times a day PRN Muscle Spasm  dextrose Oral Gel 15 Gram(s) Oral once PRN Blood Glucose LESS THAN 70 milliGRAM(s)/deciliter  guaiFENesin Oral Liquid (Sugar-Free) 200 milliGRAM(s) Oral every 6 hours PRN Cough  hydrOXYzine hydrochloride 25 milliGRAM(s) Oral two times a day PRN Itching  melatonin 3 milliGRAM(s) Oral at bedtime PRN Insomnia  methocarbamol 750 milliGRAM(s) Oral two times a day PRN Muscle Spasm      CAPILLARY BLOOD GLUCOSE      POCT Blood Glucose.: 333 mg/dL (27 Oct 2023 08:37)  POCT Blood Glucose.: 215 mg/dL (26 Oct 2023 23:29)  POCT Blood Glucose.: 212 mg/dL (26 Oct 2023 21:53)  POCT Blood Glucose.: 276 mg/dL (26 Oct 2023 17:29)  POCT Blood Glucose.: 311 mg/dL (26 Oct 2023 12:15)  POCT Blood Glucose.: 318 mg/dL (26 Oct 2023 11:21)    I&O's Summary      PHYSICAL EXAM:  Vital Signs Last 24 Hrs  T(C): 36.9 (27 Oct 2023 05:00), Max: 37.1 (26 Oct 2023 09:39)  T(F): 98.4 (27 Oct 2023 05:00), Max: 98.8 (26 Oct 2023 09:39)  HR: 84 (27 Oct 2023 05:00) (83 - 96)  BP: 114/61 (27 Oct 2023 05:00) (114/61 - 140/80)  BP(mean): --  RR: 16 (27 Oct 2023 05:00) (16 - 18)  SpO2: 95% (27 Oct 2023 05:00) (91% - 96%)    Parameters below as of 27 Oct 2023 04:24  Patient On (Oxygen Delivery Method): nasal cannula        CONSTITUTIONAL: NAD, well-developed  RESPIRATORY: Normal respiratory effort; course breath sounds diffusely posterior lung fields   CARDIOVASCULAR: Regular rate and rhythm, normal S1 and S2, no murmur/rub/gallop; No lower extremity edema; Peripheral pulses are 2+ bilaterally  ABDOMEN: Nontender to palpation, normoactive bowel sounds, no rebound/guarding; No hepatosplenomegaly  MSK: no clubbing or cyanosis of digits; no joint swelling , ttp over b/l lower extremities, no obvious redness or asymmetry  PSYCH: A+O to person, place, and time; affect appropriate    LABS:                        14.7   10.49 )-----------( 220      ( 27 Oct 2023 06:30 )             45.3     10-27    134<L>  |  94<L>  |  45<H>  ----------------------------<  386<H>  4.9   |  29  |  1.56<H>    Ca    9.9      27 Oct 2023 06:30  Phos  4.5     10-27  Mg     2.50     10-27    TPro  x   /  Alb  x   /  TBili  0.4  /  DBili  x   /  AST  x   /  ALT  x   /  AlkPhos  x   10-27    PT/INR - ( 27 Oct 2023 06:30 )   PT: 11.7 sec;   INR: 1.04 ratio         PTT - ( 25 Oct 2023 22:05 )  PTT:34.2 sec  CARDIAC MARKERS ( 26 Oct 2023 02:28 )  x     / x     / 51 U/L / x     / <1.0 ng/mL  CARDIAC MARKERS ( 25 Oct 2023 22:05 )  x     / x     / 50 U/L / x     / <1.0 ng/mL      Urinalysis Basic - ( 27 Oct 2023 06:30 )    Color: x / Appearance: x / SG: x / pH: x  Gluc: 386 mg/dL / Ketone: x  / Bili: x / Urobili: x   Blood: x / Protein: x / Nitrite: x   Leuk Esterase: x / RBC: x / WBC x   Sq Epi: x / Non Sq Epi: x / Bacteria: x        Culture - Sputum (collected 26 Oct 2023 17:50)  Source: .Sputum Sputum  Gram Stain (26 Oct 2023 22:41):    No polymorphonuclear leukocytes per low power field    Few Squamous epithelial cells per low power field    Few Gram positive cocci in pairs per oil power field    Rare Gram Negative Rods per oil power field    Culture - Blood (collected 25 Oct 2023 22:20)  Source: .Blood Blood-Venous  Preliminary Report (27 Oct 2023 03:01):    No growth at 24 hours    Culture - Blood (collected 25 Oct 2023 22:00)  Source: .Blood Blood-Peripheral  Preliminary Report (27 Oct 2023 03:01):    No growth at 24 hours        Tele Reviewed:    RADIOLOGY & ADDITIONAL TESTS:  Results Reviewed:   Imaging Personally Reviewed:  Electrocardiogram Personally Reviewed:

## 2023-10-27 NOTE — PROGRESS NOTE ADULT - TIME BILLING
Time-based billing (NON-critical care).     More than 50% of the visit was spent counseling and / or coordinating care by the attending physician.      The necessity of the time spent during the encounter on this date of service was due to: documentation in [place], reviewing chart and coordinating care with patient/ACPs and interdisciplinary staff (such as , social workers, etc) as well as reviewing vitals, laboratory data, radiology, medication list, consultants' recommendations and prior records. Interventions were performed as documented above.

## 2023-10-27 NOTE — PROGRESS NOTE ADULT - SUBJECTIVE AND OBJECTIVE BOX
Clint Escobedo MD  Division of Hospitalist Medicine  Pager [phone]  Available on Teams    CC: Patient is a 68y old  Female who presents with a chief complaint of cough, CP, SOB x1 week (27 Oct 2023 09:37)      INTERVAL EVENTS: SAM    SUBJECTIVE / INTERVAL HPI: Patient seen and examined at bedside. Pt reports improvement in shortness of breath. Leg cramping overnight. No BMs but passing gas.    ROS: negative unless otherwise stated above.    VITAL SIGNS:  Vital Signs Last 24 Hrs  T(C): 36.9 (27 Oct 2023 05:00), Max: 36.9 (26 Oct 2023 20:34)  T(F): 98.4 (27 Oct 2023 05:00), Max: 98.4 (26 Oct 2023 20:34)  HR: 88 (27 Oct 2023 10:35) (84 - 96)  BP: 114/61 (27 Oct 2023 05:00) (114/61 - 116/68)  BP(mean): --  RR: 16 (27 Oct 2023 05:00) (16 - 18)  SpO2: 90% (27 Oct 2023 10:35) (90% - 96%)    Parameters below as of 27 Oct 2023 10:35  Patient On (Oxygen Delivery Method): room air            PHYSICAL EXAM:    General: NAD  HEENT: MMM  Neck: supple  Cardiovascular: +S1/S2; RRR  Respiratory: CTA B/L; no W/R/R; no respiratory distress; speaking in full sentences  Gastrointestinal: soft, distended with two nontender ventral hernias  Extremities: WWP; no edema, clubbing or cyanosis  Vascular: palpable radial, DP pulses B/L  Neurological: AAOx3; no focal deficits    MEDICATIONS:  MEDICATIONS  (STANDING):  albuterol/ipratropium for Nebulization 3 milliLiter(s) Nebulizer every 6 hours  amLODIPine   Tablet 5 milliGRAM(s) Oral daily  apixaban 5 milliGRAM(s) Oral two times a day  atorvastatin 80 milliGRAM(s) Oral at bedtime  azithromycin   Tablet 500 milliGRAM(s) Oral every 24 hours  benzonatate 100 milliGRAM(s) Oral every 8 hours  budesonide 160 MICROgram(s)/formoterol 4.5 MICROgram(s) Inhaler 2 Puff(s) Inhalation two times a day  dextrose 5%. 1000 milliLiter(s) (50 mL/Hr) IV Continuous <Continuous>  dextrose 5%. 1000 milliLiter(s) (100 mL/Hr) IV Continuous <Continuous>  dextrose 50% Injectable 25 Gram(s) IV Push once  dextrose 50% Injectable 25 Gram(s) IV Push once  dextrose 50% Injectable 12.5 Gram(s) IV Push once  DULoxetine 30 milliGRAM(s) Oral daily  FLUoxetine 20 milliGRAM(s) Oral daily  glucagon  Injectable 1 milliGRAM(s) IntraMuscular once  insulin glargine Injectable (LANTUS) 37 Unit(s) SubCutaneous at bedtime  insulin lispro (ADMELOG) corrective regimen sliding scale   SubCutaneous three times a day before meals  insulin lispro (ADMELOG) corrective regimen sliding scale   SubCutaneous at bedtime  insulin lispro Injectable (ADMELOG) 9 Unit(s) SubCutaneous three times a day before meals  lactulose Syrup 10 Gram(s) Oral two times a day  polyethylene glycol 3350 17 Gram(s) Oral daily  predniSONE   Tablet 40 milliGRAM(s) Oral daily  senna 2 Tablet(s) Oral at bedtime  spironolactone 25 milliGRAM(s) Oral two times a day  tadalafil 20 milliGRAM(s) Oral daily  torsemide 100 milliGRAM(s) Oral two times a day  traZODone 150 milliGRAM(s) Oral at bedtime    MEDICATIONS  (PRN):  acetaminophen     Tablet .. 650 milliGRAM(s) Oral every 6 hours PRN Temp greater or equal to 38C (100.4F), Mild Pain (1 - 3)  cyclobenzaprine 10 milliGRAM(s) Oral three times a day PRN Muscle Spasm  dextrose Oral Gel 15 Gram(s) Oral once PRN Blood Glucose LESS THAN 70 milliGRAM(s)/deciliter  guaiFENesin Oral Liquid (Sugar-Free) 200 milliGRAM(s) Oral every 6 hours PRN Cough  hydrOXYzine hydrochloride 25 milliGRAM(s) Oral two times a day PRN Itching  melatonin 3 milliGRAM(s) Oral at bedtime PRN Insomnia  methocarbamol 750 milliGRAM(s) Oral two times a day PRN Muscle Spasm      ALLERGIES:  Allergies    No Known Allergies    Intolerances        LABS:                        14.7   10.49 )-----------( 220      ( 27 Oct 2023 06:30 )             45.3     10-27    134<L>  |  94<L>  |  45<H>  ----------------------------<  386<H>  4.9   |  29  |  1.56<H>    Ca    9.9      27 Oct 2023 06:30  Phos  4.5     10-27  Mg     2.50     10-27    TPro  x   /  Alb  x   /  TBili  0.4  /  DBili  x   /  AST  x   /  ALT  x   /  AlkPhos  x   10-27    PT/INR - ( 27 Oct 2023 06:30 )   PT: 11.7 sec;   INR: 1.04 ratio         PTT - ( 25 Oct 2023 22:05 )  PTT:34.2 sec  Urinalysis Basic - ( 27 Oct 2023 06:30 )    Color: x / Appearance: x / SG: x / pH: x  Gluc: 386 mg/dL / Ketone: x  / Bili: x / Urobili: x   Blood: x / Protein: x / Nitrite: x   Leuk Esterase: x / RBC: x / WBC x   Sq Epi: x / Non Sq Epi: x / Bacteria: x      CAPILLARY BLOOD GLUCOSE      POCT Blood Glucose.: 333 mg/dL (27 Oct 2023 08:37)      RADIOLOGY & ADDITIONAL TESTS: Reviewed.

## 2023-10-27 NOTE — PROGRESS NOTE ADULT - PROBLEM SELECTOR PLAN 7
Patient believes she may have had hx of DVT but is unsure. She is on eliquis but is not sure specifically why  -resume eliquis as pt high risk for recurrence given poor functional status

## 2023-10-27 NOTE — PROGRESS NOTE ADULT - PROBLEM SELECTOR PLAN 1
Hx of pHTN at home 2L O2 at rest, 3L with activity. Follows with Dr. Recio at [facility]. SOB after walking <1 block at baseline. On albuterol prn for COPD and tadalafil for pHTN. Not adherent to home Bipap 15/5 for WALTER. Reports ?2 hospitalizations for "breathing", 1 in 2019 and 1 last week, but unclear if actually for COPD as pt poor historian and reports she was there for different complaints as well (eg abdominal pain from ventral hernias). Now presented with wheezing, green phlegm with cough, SOB, requiring 6L in ED.  - discussed with Dr. Recio, pt very poorly adherent to medications and office visits. PASP 1.5 years ago 72. He is agreement to treat for COPD exacerbation, start on COPD meds and f/u o/p when stable for d/c  - RVP/COVID neg  - CO2 69, not acidemic  - CXR with emphysema but no acute infiltrate or pulmonary edema noted  - CT chest: emphysema and atelectasis without focal consolidation  -   Plan:  - presentation c/w with COPD exacerbation, likely in the setting of minimal medication/CPAP adherence. will treat for pneumonia as pt admitted with COPD exacerbation. however, will only treat for CAP as it is unclear if pt received abx at recent admission and low c/f severe pneumonia at this time given lack of leukocytosis or fever. less likely CHF exacerbation as pt does not appear fluid overloaded with low age-adjusted BNP  - s/p dex, will continue pred 40mg qd x5 days  - duonebs q6 standing, will switch to tiotropium on d/c  - will d/c ceftriaxone as no consolidation on CT chest and normal sputum cx  - continue azithromycin x3 days  - started on symbicort  - f/u TTE

## 2023-10-27 NOTE — PROGRESS NOTE ADULT - PROBLEM SELECTOR PLAN 5
- as above, does not appear to be in acute exacerbation  - s/p metolazone 10mg x1 in ED. Does not take any longer for months. Used to be on 10mg MWF.  - follows cards [facility]  Plan:  - resume home torsemide 100mg BID, иван 25mg BID  - f/u TTE

## 2023-10-27 NOTE — PROGRESS NOTE ADULT - ASSESSMENT
68F with PMHx CHF, COPD on 3L home O2, DM2 on insulin, hx DVT/PE? on eliquis, hx SBO, hx muscle spasms presenting with chest pain, cough and SOB x1 week with concern for  COPD exacerbation vs HCAP.       #Hypoxia  #COPD Exacerbation  #CAP    -several days of increasing cough, SOB, no change in sputum color or consistency, current sx appeared to be triggered by likely bacterial vs viral process leading to COPD exacerbation  - CXR with RLL opacity, hx of subjective fevers at home however afebrile inpatient, +Staph Aureus PCR, sputum culture with gram + cocci  - VBG wnl, pCO2 64, pt does not look toxic appearing, sating >90% on room air and NC overnight   - can wean O2 to baseline 3L as tolerated, maintain O2 >90%, add humidifier given nasal bleeding  - continue Duonebs q6h and prednisone while inpatient, will likely need maintenance LAMA on discharge  - can utilize Aerobika for further airway clearance   - nicotine patch given current smoking hx 68F with PMHx CHF, COPD on 3L home O2, DM2 on insulin, hx DVT/PE? on eliquis, hx SBO, hx muscle spasms presenting with chest pain, cough and SOB x1 week with concern for  COPD exacerbation vs HCAP.       #Hypoxia  #COPD Exacerbation  #CAP    -several days of increasing cough, SOB, no change in sputum color or consistency, current sx appeared to be triggered by likely bacterial vs viral process leading to COPD exacerbation  - CXR with RLL opacity, CT read as atelectasis and emphysema,  hx of subjective fevers at home however afebrile inpatient, +Staph Aureus PCR, sputum culture with gram + cocci which could be normal constance, can consider d/c ceftriaxone and continue azithromycin   - VBG wnl, pCO2 64, pt does not look toxic appearing, sating >90% on room air and NC overnight   - can wean O2 to baseline 3L as tolerated, maintain O2 >90%, add humidifier given nasal bleeding  - continue Duonebs q6h and prednisone while inpatient, will likely need maintenance LAMA on discharge  - can utilize Aerobika for further airway clearance   - nicotine patch given current smoking hx  68F with PMHx CHF, COPD on 3L home O2, DM2 on insulin, hx DVT/PE? on eliquis, hx SBO, hx muscle spasms presenting with chest pain, cough and SOB x1 week with concern for  COPD exacerbation vs HCAP.       #Hypoxia  #COPD Exacerbation  #CAP    -several days of increasing cough, SOB, no change in sputum color or consistency, current sx appeared to be triggered by likely bacterial vs viral process leading to COPD exacerbation  - CXR with RLL opacity, CT read as atelectasis and emphysema,  hx of subjective fevers at home however afebrile inpatient, +Staph Aureus PCR, sputum culture with gram + cocci which could be normal constance, can consider d/c ceftriaxone and continue azithromycin   - VBG wnl, pCO2 64, pt does not look toxic appearing, sating >90% on room air and NC overnight   - can wean O2 to baseline 3L as tolerated, maintain O2 >90%, add humidifier given nasal bleeding  - continue Duonebs q6h and prednisone while inpatient, will  need maintenance LAMA on discharge  - can utilize Aerobika for further airway clearance   - nicotine patch given current smoking hx   - pt can follow up outpatient with [facility] pulmonologist upon discharge  68F with PMHx CHF, COPD on 3L home O2, DM2 on insulin, hx DVT/PE? on eliquis, hx SBO, hx muscle spasms presenting with chest pain, cough and SOB x1 week with concern for  COPD exacerbation vs HCAP.       #Hypoxia  #COPD Exacerbation  #CAP    -several days of increasing cough, SOB, no change in sputum color or consistency, current sx appeared to be triggered by likely bacterial vs viral process leading to COPD exacerbation, improving today 10/27   - CXR with possible RLL opacity, CT read as atelectasis and emphysema,  hx of subjective fevers at home however afebrile inpatient, +Staph Aureus PCR, sputum culture with gram + cocci which could be normal constance, can consider d/c ceftriaxone and continue azithromycin   - VBG wnl, pCO2 64, pt does not look toxic appearing, sating >90% on room air and NC overnight   - can wean O2 to baseline 3L as tolerated, maintain O2 >90%, add humidifier given nasal bleeding  - continue Duonebs q6h and prednisone while inpatient, will  need maintenance LAMA on discharge  - can utilize Aerobika for further airway clearance   - nicotine patch given current smoking hx   - pt optimized for discharge from pulmonology standpoint- can complete course of abx and prednisone outpatient and follow up with outpatient pulmonologist upon discharge

## 2023-10-27 NOTE — PROGRESS NOTE ADULT - PROBLEM SELECTOR PLAN 6
On glargine 40u qhs, novolog 7u TID, jardiance 10, trulicity  -A1C 6.4  Plan:  - increase to lantus 37u, lispro 9u  - will consider restarting jardiance pending Cr

## 2023-10-28 ENCOUNTER — TRANSCRIPTION ENCOUNTER (OUTPATIENT)
Age: 68
End: 2023-10-28

## 2023-10-28 DIAGNOSIS — N17.9 ACUTE KIDNEY FAILURE, UNSPECIFIED: ICD-10-CM

## 2023-10-28 DIAGNOSIS — E78.5 HYPERLIPIDEMIA, UNSPECIFIED: ICD-10-CM

## 2023-10-28 DIAGNOSIS — I10 ESSENTIAL (PRIMARY) HYPERTENSION: ICD-10-CM

## 2023-10-28 LAB
ANION GAP SERPL CALC-SCNC: 14 MMOL/L — SIGNIFICANT CHANGE UP (ref 7–14)
ANION GAP SERPL CALC-SCNC: 14 MMOL/L — SIGNIFICANT CHANGE UP (ref 7–14)
BUN SERPL-MCNC: 68 MG/DL — HIGH (ref 7–23)
BUN SERPL-MCNC: 68 MG/DL — HIGH (ref 7–23)
CALCIUM SERPL-MCNC: 9.5 MG/DL — SIGNIFICANT CHANGE UP (ref 8.4–10.5)
CALCIUM SERPL-MCNC: 9.5 MG/DL — SIGNIFICANT CHANGE UP (ref 8.4–10.5)
CHLORIDE SERPL-SCNC: 94 MMOL/L — LOW (ref 98–107)
CHLORIDE SERPL-SCNC: 94 MMOL/L — LOW (ref 98–107)
CO2 SERPL-SCNC: 28 MMOL/L — SIGNIFICANT CHANGE UP (ref 22–31)
CO2 SERPL-SCNC: 28 MMOL/L — SIGNIFICANT CHANGE UP (ref 22–31)
CREAT SERPL-MCNC: 1.72 MG/DL — HIGH (ref 0.5–1.3)
CREAT SERPL-MCNC: 1.72 MG/DL — HIGH (ref 0.5–1.3)
CULTURE RESULTS: ABNORMAL
CULTURE RESULTS: ABNORMAL
EGFR: 32 ML/MIN/1.73M2 — LOW
EGFR: 32 ML/MIN/1.73M2 — LOW
GLUCOSE BLDC GLUCOMTR-MCNC: 208 MG/DL — HIGH (ref 70–99)
GLUCOSE BLDC GLUCOMTR-MCNC: 208 MG/DL — HIGH (ref 70–99)
GLUCOSE BLDC GLUCOMTR-MCNC: 237 MG/DL — HIGH (ref 70–99)
GLUCOSE BLDC GLUCOMTR-MCNC: 237 MG/DL — HIGH (ref 70–99)
GLUCOSE BLDC GLUCOMTR-MCNC: 330 MG/DL — HIGH (ref 70–99)
GLUCOSE BLDC GLUCOMTR-MCNC: 330 MG/DL — HIGH (ref 70–99)
GLUCOSE BLDC GLUCOMTR-MCNC: 334 MG/DL — HIGH (ref 70–99)
GLUCOSE BLDC GLUCOMTR-MCNC: 334 MG/DL — HIGH (ref 70–99)
GLUCOSE SERPL-MCNC: 287 MG/DL — HIGH (ref 70–99)
GLUCOSE SERPL-MCNC: 287 MG/DL — HIGH (ref 70–99)
HCT VFR BLD CALC: 46.1 % — HIGH (ref 34.5–45)
HCT VFR BLD CALC: 46.1 % — HIGH (ref 34.5–45)
HGB BLD-MCNC: 14.6 G/DL — SIGNIFICANT CHANGE UP (ref 11.5–15.5)
HGB BLD-MCNC: 14.6 G/DL — SIGNIFICANT CHANGE UP (ref 11.5–15.5)
MAGNESIUM SERPL-MCNC: 2.5 MG/DL — SIGNIFICANT CHANGE UP (ref 1.6–2.6)
MAGNESIUM SERPL-MCNC: 2.5 MG/DL — SIGNIFICANT CHANGE UP (ref 1.6–2.6)
MCHC RBC-ENTMCNC: 29.6 PG — SIGNIFICANT CHANGE UP (ref 27–34)
MCHC RBC-ENTMCNC: 29.6 PG — SIGNIFICANT CHANGE UP (ref 27–34)
MCHC RBC-ENTMCNC: 31.7 GM/DL — LOW (ref 32–36)
MCHC RBC-ENTMCNC: 31.7 GM/DL — LOW (ref 32–36)
MCV RBC AUTO: 93.3 FL — SIGNIFICANT CHANGE UP (ref 80–100)
MCV RBC AUTO: 93.3 FL — SIGNIFICANT CHANGE UP (ref 80–100)
NRBC # BLD: 0 /100 WBCS — SIGNIFICANT CHANGE UP (ref 0–0)
NRBC # BLD: 0 /100 WBCS — SIGNIFICANT CHANGE UP (ref 0–0)
NRBC # FLD: 0 K/UL — SIGNIFICANT CHANGE UP (ref 0–0)
NRBC # FLD: 0 K/UL — SIGNIFICANT CHANGE UP (ref 0–0)
PHOSPHATE SERPL-MCNC: 5.8 MG/DL — HIGH (ref 2.5–4.5)
PHOSPHATE SERPL-MCNC: 5.8 MG/DL — HIGH (ref 2.5–4.5)
PLATELET # BLD AUTO: 243 K/UL — SIGNIFICANT CHANGE UP (ref 150–400)
PLATELET # BLD AUTO: 243 K/UL — SIGNIFICANT CHANGE UP (ref 150–400)
POTASSIUM SERPL-MCNC: 3.8 MMOL/L — SIGNIFICANT CHANGE UP (ref 3.5–5.3)
POTASSIUM SERPL-MCNC: 3.8 MMOL/L — SIGNIFICANT CHANGE UP (ref 3.5–5.3)
POTASSIUM SERPL-SCNC: 3.8 MMOL/L — SIGNIFICANT CHANGE UP (ref 3.5–5.3)
POTASSIUM SERPL-SCNC: 3.8 MMOL/L — SIGNIFICANT CHANGE UP (ref 3.5–5.3)
RBC # BLD: 4.94 M/UL — SIGNIFICANT CHANGE UP (ref 3.8–5.2)
RBC # BLD: 4.94 M/UL — SIGNIFICANT CHANGE UP (ref 3.8–5.2)
RBC # FLD: 13.9 % — SIGNIFICANT CHANGE UP (ref 10.3–14.5)
RBC # FLD: 13.9 % — SIGNIFICANT CHANGE UP (ref 10.3–14.5)
SODIUM SERPL-SCNC: 136 MMOL/L — SIGNIFICANT CHANGE UP (ref 135–145)
SODIUM SERPL-SCNC: 136 MMOL/L — SIGNIFICANT CHANGE UP (ref 135–145)
SPECIMEN SOURCE: SIGNIFICANT CHANGE UP
SPECIMEN SOURCE: SIGNIFICANT CHANGE UP
WBC # BLD: 11.34 K/UL — HIGH (ref 3.8–10.5)
WBC # BLD: 11.34 K/UL — HIGH (ref 3.8–10.5)
WBC # FLD AUTO: 11.34 K/UL — HIGH (ref 3.8–10.5)
WBC # FLD AUTO: 11.34 K/UL — HIGH (ref 3.8–10.5)

## 2023-10-28 PROCEDURE — 99223 1ST HOSP IP/OBS HIGH 75: CPT

## 2023-10-28 PROCEDURE — 93010 ELECTROCARDIOGRAM REPORT: CPT

## 2023-10-28 PROCEDURE — 99233 SBSQ HOSP IP/OBS HIGH 50: CPT

## 2023-10-28 RX ORDER — SODIUM CHLORIDE 9 MG/ML
1000 INJECTION, SOLUTION INTRAVENOUS
Refills: 0 | Status: DISCONTINUED | OUTPATIENT
Start: 2023-10-28 | End: 2023-11-01

## 2023-10-28 RX ORDER — AZITHROMYCIN 500 MG/1
500 TABLET, FILM COATED ORAL DAILY
Refills: 0 | Status: COMPLETED | OUTPATIENT
Start: 2023-10-28 | End: 2023-10-31

## 2023-10-28 RX ORDER — INSULIN LISPRO 100/ML
12 VIAL (ML) SUBCUTANEOUS
Refills: 0 | Status: DISCONTINUED | OUTPATIENT
Start: 2023-10-28 | End: 2023-11-01

## 2023-10-28 RX ORDER — TIOTROPIUM BROMIDE 18 UG/1
2 CAPSULE ORAL; RESPIRATORY (INHALATION) DAILY
Refills: 0 | Status: DISCONTINUED | OUTPATIENT
Start: 2023-10-28 | End: 2023-11-01

## 2023-10-28 RX ORDER — IPRATROPIUM/ALBUTEROL SULFATE 18-103MCG
3 AEROSOL WITH ADAPTER (GRAM) INHALATION EVERY 6 HOURS
Refills: 0 | Status: DISCONTINUED | OUTPATIENT
Start: 2023-10-28 | End: 2023-11-01

## 2023-10-28 RX ORDER — INSULIN GLARGINE 100 [IU]/ML
40 INJECTION, SOLUTION SUBCUTANEOUS AT BEDTIME
Refills: 0 | Status: DISCONTINUED | OUTPATIENT
Start: 2023-10-28 | End: 2023-11-01

## 2023-10-28 RX ADMIN — AMLODIPINE BESYLATE 5 MILLIGRAM(S): 2.5 TABLET ORAL at 07:08

## 2023-10-28 RX ADMIN — Medication 650 MILLIGRAM(S): at 07:08

## 2023-10-28 RX ADMIN — Medication 3 MILLILITER(S): at 04:26

## 2023-10-28 RX ADMIN — Medication 9 UNIT(S): at 13:15

## 2023-10-28 RX ADMIN — Medication 3 MILLILITER(S): at 10:26

## 2023-10-28 RX ADMIN — APIXABAN 5 MILLIGRAM(S): 2.5 TABLET, FILM COATED ORAL at 17:32

## 2023-10-28 RX ADMIN — Medication 12 UNIT(S): at 17:32

## 2023-10-28 RX ADMIN — Medication 4: at 17:31

## 2023-10-28 RX ADMIN — Medication 25 MILLIGRAM(S): at 07:24

## 2023-10-28 RX ADMIN — APIXABAN 5 MILLIGRAM(S): 2.5 TABLET, FILM COATED ORAL at 07:08

## 2023-10-28 RX ADMIN — Medication 100 MILLIGRAM(S): at 07:07

## 2023-10-28 RX ADMIN — INSULIN GLARGINE 40 UNIT(S): 100 INJECTION, SOLUTION SUBCUTANEOUS at 22:00

## 2023-10-28 RX ADMIN — Medication 200 MILLIGRAM(S): at 13:17

## 2023-10-28 RX ADMIN — TADALAFIL 20 MILLIGRAM(S): 10 TABLET, FILM COATED ORAL at 17:33

## 2023-10-28 RX ADMIN — Medication 100 MILLIGRAM(S): at 21:57

## 2023-10-28 RX ADMIN — Medication 9 UNIT(S): at 09:06

## 2023-10-28 RX ADMIN — Medication 650 MILLIGRAM(S): at 22:02

## 2023-10-28 RX ADMIN — Medication 100 MILLIGRAM(S): at 07:24

## 2023-10-28 RX ADMIN — DULOXETINE HYDROCHLORIDE 30 MILLIGRAM(S): 30 CAPSULE, DELAYED RELEASE ORAL at 13:17

## 2023-10-28 RX ADMIN — Medication 150 MILLIGRAM(S): at 21:58

## 2023-10-28 RX ADMIN — Medication 4: at 13:15

## 2023-10-28 RX ADMIN — Medication 20 MILLIGRAM(S): at 13:17

## 2023-10-28 RX ADMIN — Medication 2: at 09:06

## 2023-10-28 RX ADMIN — ATORVASTATIN CALCIUM 80 MILLIGRAM(S): 80 TABLET, FILM COATED ORAL at 23:02

## 2023-10-28 RX ADMIN — LACTULOSE 10 GRAM(S): 10 SOLUTION ORAL at 07:08

## 2023-10-28 RX ADMIN — SPIRONOLACTONE 25 MILLIGRAM(S): 25 TABLET, FILM COATED ORAL at 07:07

## 2023-10-28 RX ADMIN — Medication 40 MILLIGRAM(S): at 07:07

## 2023-10-28 RX ADMIN — BUDESONIDE AND FORMOTEROL FUMARATE DIHYDRATE 2 PUFF(S): 160; 4.5 AEROSOL RESPIRATORY (INHALATION) at 10:44

## 2023-10-28 RX ADMIN — SODIUM CHLORIDE 100 MILLILITER(S): 9 INJECTION, SOLUTION INTRAVENOUS at 13:17

## 2023-10-28 RX ADMIN — AZITHROMYCIN 500 MILLIGRAM(S): 500 TABLET, FILM COATED ORAL at 17:38

## 2023-10-28 NOTE — PROGRESS NOTE ADULT - PROBLEM SELECTOR PLAN 9
Hx of large ventral hernias after abdominal surgery in the past. Recent admission that per pt's report, sounds likely c/f bowel obstruction but was able to have BM.  -Passing gas, no abdominal pain  -senna, miralax, lactulose, psyllium

## 2023-10-28 NOTE — PROGRESS NOTE ADULT - TIME BILLING
Time-based billing (NON-critical care).     More than 50% of the visit was spent counseling and / or coordinating care by the attending physician.      The necessity of the time spent during the encounter on this date of service was due to: documentation in [place], reviewing chart and coordinating care with patient/ACPs and interdisciplinary staff (such as , social workers, etc) as well as reviewing vitals, laboratory data, radiology, medication list, consultants' recommendations and prior records. Interventions were performed as documented above.

## 2023-10-28 NOTE — PROGRESS NOTE ADULT - PROBLEM SELECTOR PLAN 7
On glargine 40u qhs, novolog 7u TID, jardiance 10, trulicity  -A1C 6.4  Plan:  - increase to lantus 37u, lispro 9u  - will consider restarting jardiance pending DELORES

## 2023-10-28 NOTE — DISCHARGE NOTE NURSING/CASE MANAGEMENT/SOCIAL WORK - PATIENT PORTAL LINK FT
You can access the FollowMyHealth Patient Portal offered by  by registering at the following website: [email or web address]. By joining [facility]’s FollowMyHealth portal, you will also be able to view your health information using other applications (apps) compatible with our system.

## 2023-10-28 NOTE — CONSULT NOTE ADULT - ASSESSMENT
67 y/o F w/ hx of uncontrolled Type 2 DM w/ hyperglycemia complicated by neuropathy, HTN, HLD now with hyperglycemia exacerbated by prednisone (high risk patient with severe hyperglycemia with glucose values > 300's at high risk of CAD and CVA with high level decision-making).

## 2023-10-28 NOTE — PROGRESS NOTE ADULT - PROBLEM SELECTOR PLAN 6
- as above, does not appear to be in acute exacerbation  - s/p metolazone 10mg x1 in ED. Does not take any longer for months. Used to be on 10mg MWF.  - follows cards [facility]  - TTE without HFpEF or HFrEF  Plan:  - hold home torsemide 100mg BID, иван 25mg BID in the setting of DELORES

## 2023-10-28 NOTE — PROGRESS NOTE ADULT - PROBLEM SELECTOR PLAN 1
Hx of pHTN at home 2L O2 at rest, 3L with activity. Follows with Dr. Recio at [facility]. SOB after walking <1 block at baseline. On albuterol prn for COPD and tadalafil for pHTN. Not adherent to home Bipap 15/5 for WALTER. Reports ?2 hospitalizations for "breathing", 1 in 2019 and 1 last week, but unclear if actually for COPD as pt poor historian and reports she was there for different complaints as well (eg abdominal pain from ventral hernias). Now presented with wheezing, green phlegm with cough, SOB, requiring 6L in ED.  - discussed with Dr. Recio, pt very poorly adherent to medications and office visits. PASP 1.5 years ago 72. He is agreement to treat for COPD exacerbation, start on COPD meds and f/u o/p when stable for d/c  - RVP/COVID neg  - CO2 69, not acidemic  - CXR with emphysema but no acute infiltrate or pulmonary edema noted  - CT chest: emphysema and atelectasis without focal consolidation  -   - TTE without HFrEF or HFpEF  Plan:  - presentation c/w with COPD exacerbation, likely in the setting of minimal medication/CPAP adherence. will treat for pneumonia as pt admitted with COPD exacerbation. however, will only treat for CAP as it is unclear if pt received abx at recent admission and low c/f severe pneumonia at this time given lack of leukocytosis or fever. less likely CHF exacerbation as pt does not appear fluid overloaded with low age-adjusted BNP  - s/p dex, will continue pred 40mg qd x5 days  - duonebs q6 prn  - started tiotropium  - d/c'ed symbicort as pt with poor medical adherence and would like to minimize medications  - d/c'ed ceftriaxone as no consolidation on CT chest and normal sputum cx  - continue azithromycin x5 days

## 2023-10-28 NOTE — PROGRESS NOTE ADULT - SUBJECTIVE AND OBJECTIVE BOX
Clint Escobedo MD  Division of Hospitalist Medicine  Pager [phone]  Available on Teams    CC: Patient is a 68y old  Female who presents with a chief complaint of cough, CP, SOB x1 week (27 Oct 2023 12:23)      INTERVAL EVENTS: SAM    SUBJECTIVE / INTERVAL HPI: Patient seen and examined at bedside. Pt grossly denies any new complaints.     ROS: negative unless otherwise stated above.    VITAL SIGNS:  Vital Signs Last 24 Hrs  T(C): 36.9 (28 Oct 2023 07:00), Max: 36.9 (28 Oct 2023 07:00)  T(F): 98.4 (28 Oct 2023 07:00), Max: 98.4 (28 Oct 2023 07:00)  HR: 82 (28 Oct 2023 10:26) (82 - 93)  BP: 126/78 (28 Oct 2023 07:00) (126/78 - 132/85)  BP(mean): --  RR: 20 (28 Oct 2023 07:00) (16 - 20)  SpO2: 93% (28 Oct 2023 07:00) (93% - 97%)    Parameters below as of 28 Oct 2023 07:00    O2 Flow (L/min): 3          PHYSICAL EXAM:    General: NAD  HEENT: MMM  Neck: supple  Cardiovascular: +S1/S2; RRR  Respiratory: CTA B/L; no W/R/R; no respiratory distress; speaking in full sentences  Gastrointestinal: soft, distended with two nontender ventral hernias  Extremities: WWP; no edema, clubbing or cyanosis  Vascular: palpable radial, DP pulses B/L  Neurological: AAOx3; no focal deficits    MEDICATIONS:  MEDICATIONS  (STANDING):  albuterol/ipratropium for Nebulization 3 milliLiter(s) Nebulizer every 6 hours  amLODIPine   Tablet 5 milliGRAM(s) Oral daily  apixaban 5 milliGRAM(s) Oral two times a day  atorvastatin 80 milliGRAM(s) Oral at bedtime  azithromycin   Tablet 500 milliGRAM(s) Oral every 24 hours  benzonatate 100 milliGRAM(s) Oral every 8 hours  budesonide 160 MICROgram(s)/formoterol 4.5 MICROgram(s) Inhaler 2 Puff(s) Inhalation two times a day  dextrose 5%. 1000 milliLiter(s) (50 mL/Hr) IV Continuous <Continuous>  dextrose 5%. 1000 milliLiter(s) (100 mL/Hr) IV Continuous <Continuous>  dextrose 50% Injectable 25 Gram(s) IV Push once  dextrose 50% Injectable 25 Gram(s) IV Push once  dextrose 50% Injectable 12.5 Gram(s) IV Push once  DULoxetine 30 milliGRAM(s) Oral daily  FLUoxetine 20 milliGRAM(s) Oral daily  glucagon  Injectable 1 milliGRAM(s) IntraMuscular once  insulin glargine Injectable (LANTUS) 37 Unit(s) SubCutaneous at bedtime  insulin lispro (ADMELOG) corrective regimen sliding scale   SubCutaneous three times a day before meals  insulin lispro (ADMELOG) corrective regimen sliding scale   SubCutaneous at bedtime  insulin lispro Injectable (ADMELOG) 9 Unit(s) SubCutaneous three times a day before meals  lactated ringers. 1000 milliLiter(s) (100 mL/Hr) IV Continuous <Continuous>  lactulose Syrup 10 Gram(s) Oral two times a day  polyethylene glycol 3350 17 Gram(s) Oral daily  predniSONE   Tablet 40 milliGRAM(s) Oral daily  psyllium Powder 1 Packet(s) Oral daily  senna 2 Tablet(s) Oral at bedtime  tadalafil 20 milliGRAM(s) Oral daily  traZODone 150 milliGRAM(s) Oral at bedtime    MEDICATIONS  (PRN):  acetaminophen     Tablet .. 650 milliGRAM(s) Oral every 6 hours PRN Temp greater or equal to 38C (100.4F), Mild Pain (1 - 3)  cyclobenzaprine 10 milliGRAM(s) Oral three times a day PRN Muscle Spasm  dextrose Oral Gel 15 Gram(s) Oral once PRN Blood Glucose LESS THAN 70 milliGRAM(s)/deciliter  guaiFENesin Oral Liquid (Sugar-Free) 200 milliGRAM(s) Oral every 6 hours PRN Cough  hydrOXYzine hydrochloride 25 milliGRAM(s) Oral two times a day PRN Itching  melatonin 3 milliGRAM(s) Oral at bedtime PRN Insomnia  methocarbamol 750 milliGRAM(s) Oral three times a day PRN Muscle Spasm      ALLERGIES:  Allergies    No Known Allergies    Intolerances        LABS:                        14.6   11.34 )-----------( 243      ( 28 Oct 2023 06:00 )             46.1     10-28    136  |  94<L>  |  68<H>  ----------------------------<  287<H>  3.8   |  28  |  1.72<H>    Ca    9.5      28 Oct 2023 06:00  Phos  5.8     10-28  Mg     2.50     10-28    TPro  x   /  Alb  x   /  TBili  0.4  /  DBili  x   /  AST  x   /  ALT  x   /  AlkPhos  x   10-27    PT/INR - ( 27 Oct 2023 06:30 )   PT: 11.7 sec;   INR: 1.04 ratio           Urinalysis Basic - ( 28 Oct 2023 06:00 )    Color: x / Appearance: x / SG: x / pH: x  Gluc: 287 mg/dL / Ketone: x  / Bili: x / Urobili: x   Blood: x / Protein: x / Nitrite: x   Leuk Esterase: x / RBC: x / WBC x   Sq Epi: x / Non Sq Epi: x / Bacteria: x      CAPILLARY BLOOD GLUCOSE      POCT Blood Glucose.: 237 mg/dL (28 Oct 2023 08:35)      RADIOLOGY & ADDITIONAL TESTS: Reviewed.

## 2023-10-28 NOTE — DISCHARGE NOTE NURSING/CASE MANAGEMENT/SOCIAL WORK - NSDCPEFALRISK_GEN_ALL_CORE
For information on Fall & Injury Prevention, visit: [email or web address]  [email or web address]  https://www.cdc.gov/steadi/patient.html

## 2023-10-28 NOTE — PROGRESS NOTE ADULT - PROBLEM SELECTOR PLAN 3
Per o/p pulm, Cr around 1 as of 03/2023  - elevated Cr likely in the setting of diuretics with normal EF  Plan:  - d/c torsemide and spironolactone  - will d/c fluid restriction  - will give 500cc LR as pt without evidence of CHF on TTE

## 2023-10-29 ENCOUNTER — TRANSCRIPTION ENCOUNTER (OUTPATIENT)
Age: 68
End: 2023-10-29

## 2023-10-29 LAB
ANION GAP SERPL CALC-SCNC: 15 MMOL/L — HIGH (ref 7–14)
ANION GAP SERPL CALC-SCNC: 15 MMOL/L — HIGH (ref 7–14)
BUN SERPL-MCNC: 76 MG/DL — HIGH (ref 7–23)
BUN SERPL-MCNC: 76 MG/DL — HIGH (ref 7–23)
CALCIUM SERPL-MCNC: 9.4 MG/DL — SIGNIFICANT CHANGE UP (ref 8.4–10.5)
CALCIUM SERPL-MCNC: 9.4 MG/DL — SIGNIFICANT CHANGE UP (ref 8.4–10.5)
CHLORIDE SERPL-SCNC: 95 MMOL/L — LOW (ref 98–107)
CHLORIDE SERPL-SCNC: 95 MMOL/L — LOW (ref 98–107)
CO2 SERPL-SCNC: 28 MMOL/L — SIGNIFICANT CHANGE UP (ref 22–31)
CO2 SERPL-SCNC: 28 MMOL/L — SIGNIFICANT CHANGE UP (ref 22–31)
CREAT SERPL-MCNC: 1.61 MG/DL — HIGH (ref 0.5–1.3)
CREAT SERPL-MCNC: 1.61 MG/DL — HIGH (ref 0.5–1.3)
EGFR: 35 ML/MIN/1.73M2 — LOW
EGFR: 35 ML/MIN/1.73M2 — LOW
GLUCOSE BLDC GLUCOMTR-MCNC: 128 MG/DL — HIGH (ref 70–99)
GLUCOSE BLDC GLUCOMTR-MCNC: 128 MG/DL — HIGH (ref 70–99)
GLUCOSE BLDC GLUCOMTR-MCNC: 216 MG/DL — HIGH (ref 70–99)
GLUCOSE BLDC GLUCOMTR-MCNC: 216 MG/DL — HIGH (ref 70–99)
GLUCOSE BLDC GLUCOMTR-MCNC: 237 MG/DL — HIGH (ref 70–99)
GLUCOSE BLDC GLUCOMTR-MCNC: 237 MG/DL — HIGH (ref 70–99)
GLUCOSE BLDC GLUCOMTR-MCNC: 259 MG/DL — HIGH (ref 70–99)
GLUCOSE BLDC GLUCOMTR-MCNC: 259 MG/DL — HIGH (ref 70–99)
GLUCOSE SERPL-MCNC: 140 MG/DL — HIGH (ref 70–99)
GLUCOSE SERPL-MCNC: 140 MG/DL — HIGH (ref 70–99)
HCT VFR BLD CALC: 49.8 % — HIGH (ref 34.5–45)
HCT VFR BLD CALC: 49.8 % — HIGH (ref 34.5–45)
HGB BLD-MCNC: 16 G/DL — HIGH (ref 11.5–15.5)
HGB BLD-MCNC: 16 G/DL — HIGH (ref 11.5–15.5)
MAGNESIUM SERPL-MCNC: 2.8 MG/DL — HIGH (ref 1.6–2.6)
MAGNESIUM SERPL-MCNC: 2.8 MG/DL — HIGH (ref 1.6–2.6)
MCHC RBC-ENTMCNC: 29.8 PG — SIGNIFICANT CHANGE UP (ref 27–34)
MCHC RBC-ENTMCNC: 29.8 PG — SIGNIFICANT CHANGE UP (ref 27–34)
MCHC RBC-ENTMCNC: 32.1 GM/DL — SIGNIFICANT CHANGE UP (ref 32–36)
MCHC RBC-ENTMCNC: 32.1 GM/DL — SIGNIFICANT CHANGE UP (ref 32–36)
MCV RBC AUTO: 92.7 FL — SIGNIFICANT CHANGE UP (ref 80–100)
MCV RBC AUTO: 92.7 FL — SIGNIFICANT CHANGE UP (ref 80–100)
NRBC # BLD: 0 /100 WBCS — SIGNIFICANT CHANGE UP (ref 0–0)
NRBC # BLD: 0 /100 WBCS — SIGNIFICANT CHANGE UP (ref 0–0)
NRBC # FLD: 0 K/UL — SIGNIFICANT CHANGE UP (ref 0–0)
NRBC # FLD: 0 K/UL — SIGNIFICANT CHANGE UP (ref 0–0)
PHOSPHATE SERPL-MCNC: 4.8 MG/DL — HIGH (ref 2.5–4.5)
PHOSPHATE SERPL-MCNC: 4.8 MG/DL — HIGH (ref 2.5–4.5)
PLATELET # BLD AUTO: 252 K/UL — SIGNIFICANT CHANGE UP (ref 150–400)
PLATELET # BLD AUTO: 252 K/UL — SIGNIFICANT CHANGE UP (ref 150–400)
POTASSIUM SERPL-MCNC: 3.8 MMOL/L — SIGNIFICANT CHANGE UP (ref 3.5–5.3)
POTASSIUM SERPL-MCNC: 3.8 MMOL/L — SIGNIFICANT CHANGE UP (ref 3.5–5.3)
POTASSIUM SERPL-SCNC: 3.8 MMOL/L — SIGNIFICANT CHANGE UP (ref 3.5–5.3)
POTASSIUM SERPL-SCNC: 3.8 MMOL/L — SIGNIFICANT CHANGE UP (ref 3.5–5.3)
RBC # BLD: 5.37 M/UL — HIGH (ref 3.8–5.2)
RBC # BLD: 5.37 M/UL — HIGH (ref 3.8–5.2)
RBC # FLD: 13.7 % — SIGNIFICANT CHANGE UP (ref 10.3–14.5)
RBC # FLD: 13.7 % — SIGNIFICANT CHANGE UP (ref 10.3–14.5)
SODIUM SERPL-SCNC: 138 MMOL/L — SIGNIFICANT CHANGE UP (ref 135–145)
SODIUM SERPL-SCNC: 138 MMOL/L — SIGNIFICANT CHANGE UP (ref 135–145)
WBC # BLD: 10.95 K/UL — HIGH (ref 3.8–10.5)
WBC # BLD: 10.95 K/UL — HIGH (ref 3.8–10.5)
WBC # FLD AUTO: 10.95 K/UL — HIGH (ref 3.8–10.5)
WBC # FLD AUTO: 10.95 K/UL — HIGH (ref 3.8–10.5)

## 2023-10-29 PROCEDURE — 99233 SBSQ HOSP IP/OBS HIGH 50: CPT

## 2023-10-29 RX ORDER — INFLUENZA VIRUS VACCINE 15; 15; 15; 15 UG/.5ML; UG/.5ML; UG/.5ML; UG/.5ML
0.7 SUSPENSION INTRAMUSCULAR ONCE
Refills: 0 | Status: DISCONTINUED | OUTPATIENT
Start: 2023-10-29 | End: 2023-11-01

## 2023-10-29 RX ADMIN — APIXABAN 5 MILLIGRAM(S): 2.5 TABLET, FILM COATED ORAL at 17:36

## 2023-10-29 RX ADMIN — Medication 2: at 12:53

## 2023-10-29 RX ADMIN — Medication 100 MILLIGRAM(S): at 14:40

## 2023-10-29 RX ADMIN — Medication 12 UNIT(S): at 12:54

## 2023-10-29 RX ADMIN — Medication 150 MILLIGRAM(S): at 21:39

## 2023-10-29 RX ADMIN — Medication 12 UNIT(S): at 08:58

## 2023-10-29 RX ADMIN — AZITHROMYCIN 500 MILLIGRAM(S): 500 TABLET, FILM COATED ORAL at 12:55

## 2023-10-29 RX ADMIN — ATORVASTATIN CALCIUM 80 MILLIGRAM(S): 80 TABLET, FILM COATED ORAL at 21:39

## 2023-10-29 RX ADMIN — Medication 3 MILLIGRAM(S): at 21:39

## 2023-10-29 RX ADMIN — Medication 100 MILLIGRAM(S): at 21:39

## 2023-10-29 RX ADMIN — Medication 40 MILLIGRAM(S): at 06:52

## 2023-10-29 RX ADMIN — Medication 100 MILLIGRAM(S): at 06:52

## 2023-10-29 RX ADMIN — Medication 2: at 17:35

## 2023-10-29 RX ADMIN — Medication 12 UNIT(S): at 17:36

## 2023-10-29 RX ADMIN — DULOXETINE HYDROCHLORIDE 30 MILLIGRAM(S): 30 CAPSULE, DELAYED RELEASE ORAL at 12:55

## 2023-10-29 RX ADMIN — Medication 20 MILLIGRAM(S): at 12:55

## 2023-10-29 RX ADMIN — Medication 1: at 22:13

## 2023-10-29 RX ADMIN — APIXABAN 5 MILLIGRAM(S): 2.5 TABLET, FILM COATED ORAL at 06:52

## 2023-10-29 RX ADMIN — CYCLOBENZAPRINE HYDROCHLORIDE 10 MILLIGRAM(S): 10 TABLET, FILM COATED ORAL at 18:18

## 2023-10-29 RX ADMIN — TIOTROPIUM BROMIDE 2 PUFF(S): 18 CAPSULE ORAL; RESPIRATORY (INHALATION) at 12:54

## 2023-10-29 RX ADMIN — INSULIN GLARGINE 40 UNIT(S): 100 INJECTION, SOLUTION SUBCUTANEOUS at 22:13

## 2023-10-29 RX ADMIN — Medication 650 MILLIGRAM(S): at 19:15

## 2023-10-29 RX ADMIN — Medication 650 MILLIGRAM(S): at 18:19

## 2023-10-29 RX ADMIN — TADALAFIL 20 MILLIGRAM(S): 10 TABLET, FILM COATED ORAL at 12:54

## 2023-10-29 NOTE — PROGRESS NOTE ADULT - SUBJECTIVE AND OBJECTIVE BOX
Clint Escobedo MD  Division of Hospitalist Medicine  Pager [phone]  Available on Teams    CC: Patient is a 68y old  Female who presents with a chief complaint of cough, CP, SOB x1 week (28 Oct 2023 18:20)      INTERVAL EVENTS: SAM    SUBJECTIVE / INTERVAL HPI: Patient seen and examined at bedside. Pt grossly denies any new complaints.     ROS: negative unless otherwise stated above.    VITAL SIGNS:  Vital Signs Last 24 Hrs  T(C): 36.6 (29 Oct 2023 06:29), Max: 36.7 (28 Oct 2023 19:36)  T(F): 97.8 (29 Oct 2023 06:29), Max: 98.1 (28 Oct 2023 19:36)  HR: 90 (29 Oct 2023 12:23) (82 - 92)  BP: 115/62 (29 Oct 2023 12:23) (92/54 - 127/80)  BP(mean): --  RR: 18 (29 Oct 2023 12:23) (16 - 20)  SpO2: 97% (29 Oct 2023 12:23) (95% - 98%)    Parameters below as of 29 Oct 2023 12:23  Patient On (Oxygen Delivery Method): nasal cannula  O2 Flow (L/min): 3          PHYSICAL EXAM:    General: NAD  HEENT: MMM  Neck: supple  Cardiovascular: +S1/S2; RRR  Respiratory: CTA B/L; no W/R/R; no respiratory distress; speaking in full sentences  Gastrointestinal: soft, distended with two nontender ventral hernias  Extremities: WWP; no edema, clubbing or cyanosis  Vascular: palpable radial, DP pulses B/L  Neurological: AAOx3; no focal deficit    MEDICATIONS:  MEDICATIONS  (STANDING):  amLODIPine   Tablet 5 milliGRAM(s) Oral daily  apixaban 5 milliGRAM(s) Oral two times a day  atorvastatin 80 milliGRAM(s) Oral at bedtime  azithromycin   Tablet 500 milliGRAM(s) Oral daily  benzonatate 100 milliGRAM(s) Oral every 8 hours  dextrose 5%. 1000 milliLiter(s) (50 mL/Hr) IV Continuous <Continuous>  dextrose 5%. 1000 milliLiter(s) (100 mL/Hr) IV Continuous <Continuous>  dextrose 50% Injectable 25 Gram(s) IV Push once  dextrose 50% Injectable 12.5 Gram(s) IV Push once  dextrose 50% Injectable 25 Gram(s) IV Push once  DULoxetine 30 milliGRAM(s) Oral daily  FLUoxetine 20 milliGRAM(s) Oral daily  glucagon  Injectable 1 milliGRAM(s) IntraMuscular once  influenza  Vaccine (HIGH DOSE) 0.7 milliLiter(s) IntraMuscular once  insulin glargine Injectable (LANTUS) 40 Unit(s) SubCutaneous at bedtime  insulin lispro (ADMELOG) corrective regimen sliding scale   SubCutaneous three times a day before meals  insulin lispro (ADMELOG) corrective regimen sliding scale   SubCutaneous at bedtime  insulin lispro Injectable (ADMELOG) 12 Unit(s) SubCutaneous three times a day before meals  lactated ringers. 1000 milliLiter(s) (100 mL/Hr) IV Continuous <Continuous>  lactulose Syrup 10 Gram(s) Oral two times a day  polyethylene glycol 3350 17 Gram(s) Oral daily  predniSONE   Tablet 40 milliGRAM(s) Oral daily  psyllium Powder 1 Packet(s) Oral daily  senna 2 Tablet(s) Oral at bedtime  tadalafil 20 milliGRAM(s) Oral daily  tiotropium 2.5 MICROgram(s) Inhaler 2 Puff(s) Inhalation daily  traZODone 150 milliGRAM(s) Oral at bedtime    MEDICATIONS  (PRN):  acetaminophen     Tablet .. 650 milliGRAM(s) Oral every 6 hours PRN Temp greater or equal to 38C (100.4F), Mild Pain (1 - 3)  albuterol/ipratropium for Nebulization 3 milliLiter(s) Nebulizer every 6 hours PRN Shortness of Breath and/or Wheezing  cyclobenzaprine 10 milliGRAM(s) Oral three times a day PRN Muscle Spasm  dextrose Oral Gel 15 Gram(s) Oral once PRN Blood Glucose LESS THAN 70 milliGRAM(s)/deciliter  guaiFENesin Oral Liquid (Sugar-Free) 200 milliGRAM(s) Oral every 6 hours PRN Cough  hydrOXYzine hydrochloride 25 milliGRAM(s) Oral two times a day PRN Itching  melatonin 3 milliGRAM(s) Oral at bedtime PRN Insomnia  methocarbamol 750 milliGRAM(s) Oral three times a day PRN Muscle Spasm      ALLERGIES:  Allergies    No Known Allergies    Intolerances        LABS:                        16.0   10.95 )-----------( 252      ( 29 Oct 2023 06:39 )             49.8     10-29    138  |  95<L>  |  76<H>  ----------------------------<  140<H>  3.8   |  28  |  1.61<H>    Ca    9.4      29 Oct 2023 06:39  Phos  4.8     10-29  Mg     2.80     10-29        Urinalysis Basic - ( 29 Oct 2023 06:39 )    Color: x / Appearance: x / SG: x / pH: x  Gluc: 140 mg/dL / Ketone: x  / Bili: x / Urobili: x   Blood: x / Protein: x / Nitrite: x   Leuk Esterase: x / RBC: x / WBC x   Sq Epi: x / Non Sq Epi: x / Bacteria: x      CAPILLARY BLOOD GLUCOSE      POCT Blood Glucose.: 216 mg/dL (29 Oct 2023 12:01)      RADIOLOGY & ADDITIONAL TESTS: Reviewed.

## 2023-10-29 NOTE — PROGRESS NOTE ADULT - PROBLEM SELECTOR PLAN 3
Per o/p pulm, Cr around 1 as of 03/2023  - elevated Cr likely in the setting of diuretics with normal EF  Plan:  - hold torsemide and spironolactone  - renally dose meds  - improving

## 2023-10-29 NOTE — DISCHARGE NOTE PROVIDER - NSDCCPCAREPLAN_GEN_ALL_CORE_FT
PRINCIPAL DISCHARGE DIAGNOSIS  Diagnosis: COPD exacerbation  Assessment and Plan of Treatment: You came to the hospital with shortness of breath and were found to have a COPD exacerbation. Your symptoms improved with steroids. We started you on an inhaler called tiotropium to help prevent your breathing from getting this bad again. Please follow up with your pulmonologist to monitor your COPD.      SECONDARY DISCHARGE DIAGNOSES  Diagnosis: Chest pain  Assessment and Plan of Treatment: You have been having chest pain. You had an ultrasound of your heart that was normal. You went for a stress test that showed ______.     PRINCIPAL DISCHARGE DIAGNOSIS  Diagnosis: COPD exacerbation  Assessment and Plan of Treatment: You came to the hospital with shortness of breath and were found to have a COPD exacerbation. Your symptoms improved with steroids. We started you on an inhaler called tiotropium to help prevent your breathing from getting this bad again. Please follow up with your pulmonologist to monitor your COPD.      SECONDARY DISCHARGE DIAGNOSES  Diagnosis: Chest pain  Assessment and Plan of Treatment: You have been having chest pain. You had an ultrasound of your heart that was normal. You went for a stress test that showed your heart was healthy with no evidence of blockage. we spoke to your cardiology office please follow up with them on 11/6 at 3pm     PRINCIPAL DISCHARGE DIAGNOSIS  Diagnosis: COPD exacerbation  Assessment and Plan of Treatment: You came to the hospital with shortness of breath and were found to have a COPD exacerbation. Your symptoms improved with steroids. We started you on an inhaler called tiotropium to help prevent your breathing from getting this bad again. Please follow up with your pulmonologist to monitor your COPD.      SECONDARY DISCHARGE DIAGNOSES  Diagnosis: Chest pain  Assessment and Plan of Treatment: You have been having chest pain. You had an ultrasound of your heart that was normal. You went for a stress test that showed your heart was healthy with no evidence of blockage. we spoke to your cardiology office please follow up with them on 11/6 at 3pm.  For now we will STOP your metolazone  DECREASE torsemide to 20mg twice a day  CONTINUE spironolactone 50mg daily  Your cardiologist may adjust this when you visit them

## 2023-10-29 NOTE — DISCHARGE NOTE PROVIDER - NSFOLLOWUPCLINICS_GEN_ALL_ED_FT
[facility] - ENT  Otolaryngology (ENT)  [address] [place] [address]  Phone: [phone]  Fax:   Follow Up Time: Routine

## 2023-10-29 NOTE — PROGRESS NOTE ADULT - PROBLEM SELECTOR PLAN 7
On glargine 40u qhs, novolog 7u TID, jardiance 10, trulicity  -A1C 6.4  Plan:  - increase to lantus 40u, lispro 12u  - will consider restarting jardiance pending DELORES

## 2023-10-29 NOTE — PATIENT PROFILE ADULT - FALL HARM RISK - HARM RISK INTERVENTIONS

## 2023-10-29 NOTE — DISCHARGE NOTE PROVIDER - CARE PROVIDER_API CALL
,   11/6/23 at 3 pm  Phone: (   )    -  Fax: (   )    -  Follow Up Time:     bianca pineda  Phone: (   )    -  Fax: (   )    -  Follow Up Time: 1 week   Irma Catalan  Pulmonary Disease  [address] [place] [address]  Phone: [phone]  Fax: [phone]  Scheduled Appointment: 11/03/2023 10:00 AM    bianca pineda  Phone: (   )    -  Fax: (   )    -  Follow Up Time: 1 week    Epifanio Alves  [address] [place] [address] [address] [place] [address]    (your normal cards office!)  Phone: [phone]  Fax: (   )    -  Scheduled Appointment: 11/06/2023 03:00 PM

## 2023-10-29 NOTE — DISCHARGE NOTE PROVIDER - PROVIDER TOKENS
FREE:[LAST:[],PHONE:[(   )    -],FAX:[(   )    -],ADDRESS:[11/6/23 at 3 pm]],FREE:[LAST:[edwin],FIRST:[bianca],PHONE:[(   )    -],FAX:[(   )    -],FOLLOWUP:[1 week]] PROVIDER:[TOKEN:[[id number]],SCHEDULEDAPPT:[11/03/2023],SCHEDULEDAPPTTIME:[10:00 AM]],FREE:[LAST:[edwin],FIRST:[bianca],PHONE:[(   )    -],FAX:[(   )    -],FOLLOWUP:[1 week]],FREE:[LAST:[Long],FIRST:[Epifanio],PHONE:[phone]],FAX:[(   )    -],ADDRESS:[[address] [place] [address]    (your normal cards office!)],SCHEDULEDAPPT:[11/06/2023],SCHEDULEDAPPTTIME:[03:00 PM]]

## 2023-10-29 NOTE — PROGRESS NOTE ADULT - TIME BILLING
Time-based billing (NON-critical care).     More than 50% of the visit was spent counseling and / or coordinating care by the attending physician.      The necessity of the time spent during the encounter on this date of service was due to: documentation in [place], reviewing chart and coordinating care with patient/ACPs and interdisciplinary staff (such as , social workers, etc) as well as reviewing vitals, laboratory data, radiology, medication list, consultants' recommendations and prior records. Interventions were performed as documented above.

## 2023-10-29 NOTE — DISCHARGE NOTE PROVIDER - HOSPITAL COURSE
68F- PMH CHF DM2 COPD on 3L home O2 presents with increased shortness of breath for the past week, chest pressure x 1 week and also notes a cough productive of green sputum for the past week. Admitted for COPD exacerbation started on steroids with improvement. Pulm consulted recommended d/c'ing with LAMA. Course c/b steroid induced hyperglycemia. Endo consulted. Complaining of chronic squeezing chest pain that occurs spontaneously at rest trops normal. TTE normal. Ordered stress test. 68F- PMH CHF DM2 COPD on 3L home O2 presents with increased shortness of breath for the past week, chest pressure x 1 week and also notes a cough productive of green sputum for the past week. Admitted for COPD exacerbation started on steroids with improvement. Pulm consulted recommended d/c'ing with LAMA. Course c/b steroid induced hyperglycemia. Endo consulted. Complaining of chronic squeezing chest pain that occurs spontaneously at rest trops normal. TTE normal. Ordered stress test.    #COPD Exacerbation: Pt completed a course of azithromycin as well as steroids. CXR was negative for any PNA. Pt has a hx of pulm HTN on 3L NC and was returned to this baseline    #Chest pain, atypical, likely from respiratory illness: Pt underwent ekg and trops x 2 which were negative for acute disease. given her significant cardiac risk factors, she underwent a nuclear stress test which was unremarkable. We did call her cardiologist and she will f/u with Dr. Epifanio Alves on 11/6 at 3pm    >33 min have been spent in the care and coordination of this patient's care 68F- PMH CHF DM2 COPD on 3L home O2 presents with increased shortness of breath for the past week, chest pressure x 1 week and also notes a cough productive of green sputum for the past week. Admitted for COPD exacerbation started on steroids with improvement. Pulm consulted recommended d/c'ing with LAMA. Course c/b steroid induced hyperglycemia. Endo consulted. Complaining of chronic squeezing chest pain that occurs spontaneously at rest trops normal. TTE normal. Ordered stress test.    #COPD Exacerbation: Pt completed a course of azithromycin as well as steroids. CXR was negative for any PNA. Pt has a hx of pulm HTN on 3L NC and was returned to this baseline    #Chest pain, atypical, likely from respiratory illness: Pt underwent ekg and trops x 2 which were negative for acute disease. given her significant cardiac risk factors, she underwent a nuclear stress test which was unremarkable. Given the fact that her EF was normal and she had sustained an DELORES we decreased torsemide to 20mg BID, stopped metolazone and continued with spironolactone. We did call her cardiologist and she will f/u with Dr. Epifanio Alves on 11/6 at 3pm    >33 min have been spent in the care and coordination of this patient's care 68F- PMH CHF DM2 COPD on 3L home O2 presents with increased shortness of breath for the past week, chest pressure x 1 week and also notes a cough productive of green sputum for the past week. Admitted for COPD exacerbation started on steroids with improvement. Pulm consulted recommended d/c'ing with LAMA. Course c/b steroid induced hyperglycemia. Endo consulted. Complaining of chronic squeezing chest pain that occurs spontaneously at rest trops normal. TTE normal. Ordered stress test.    #COPD Exacerbation: Pt completed a course of azithromycin as well as steroids. CXR was negative for any PNA. Pt has a hx of pulm HTN on 3L NC and was returned to this baseline    #Chest pain, atypical, likely from respiratory illness: Pt underwent ekg and trops x 2 which were negative for acute disease. given her significant cardiac risk factors, she underwent a nuclear stress test which was unremarkable. Given the fact that her EF was normal and she had sustained an DELORES we decreased torsemide to 20mg BID, stopped metolazone and continued with spironolactone. We did call her cardiologist and she will f/u with Dr. Epifanio Alves on 11/6 at 3pm    #Epistaxis: Pt has known history of papillary tumor (benign). ENT consulted noted septum trauma from O2. recommended nasal spray, afrin as needed for bleeds and pressure    >33 min have been spent in the care and coordination of this patient's care

## 2023-10-29 NOTE — PROGRESS NOTE ADULT - PROBLEM SELECTOR PLAN 6
- as above, does not appear to be in acute exacerbation  - s/p metolazone 10mg x1 in ED. Does not take any longer for months. Used to be on 10mg MWF.  - follows cards [facility]  - TTE without HFpEF or HFrEF  Plan:  - hold home torsemide 100mg BID, иван 25mg BID in the setting of DELORES

## 2023-10-29 NOTE — PROGRESS NOTE ADULT - PROBLEM SELECTOR PLAN 1
Hx of pHTN at home 2L O2 at rest, 3L with activity. Follows with Dr. Recio at [facility]. SOB after walking <1 block at baseline. On albuterol prn for COPD and tadalafil for pHTN. Not adherent to home Bipap 15/5 for WALTER. Reports ?2 hospitalizations for "breathing", 1 in 2019 and 1 last week, but unclear if actually for COPD as pt poor historian and reports she was there for different complaints as well (eg abdominal pain from ventral hernias). Now presented with wheezing, green phlegm with cough, SOB, requiring 6L in ED.  - discussed with Dr. Recio, pt very poorly adherent to medications and office visits. PASP 1.5 years ago 71. He is agreement to treat for COPD exacerbation, start on COPD meds and f/u o/p when stable for d/c  - RVP/COVID neg  - CO2 69, not acidemic  - CXR with emphysema but no acute infiltrate or pulmonary edema noted  - CT chest: emphysema and atelectasis without focal consolidation  -   - TTE without HFrEF or HFpEF  Plan:  - presentation c/w with COPD exacerbation, likely in the setting of minimal medication/CPAP adherence. will treat for pneumonia as pt admitted with COPD exacerbation. however, will only treat for CAP as it is unclear if pt received abx at recent admission and low c/f severe pneumonia at this time given lack of leukocytosis or fever. less likely CHF exacerbation as pt does not appear fluid overloaded with low age-adjusted BNP  - s/p dex, will continue pred 40mg qd for total 5 day course to end 10/29  - duonebs q6 prn  - started tiotropium  - d/c'ed ceftriaxone as no consolidation on CT chest and normal sputum cx  - continue azithromycin x5 days

## 2023-10-29 NOTE — DISCHARGE NOTE PROVIDER - NSDCFUADDAPPT_GEN_ALL_CORE_FT
Follow up with  at 3 pm at 11/6/2023 Follow up with  at 3 pm at 11/6/2023    Instructions for telehealth appt with Dr. Catalan:  Telehealth Instructions: At the time of your appointment, you will receive a text/email invite for your telehealth session. Please click on the link, enter the patient's name. You will then be redirected to a virtual waiting room, where you will wait until the doctor has connected with you.

## 2023-10-29 NOTE — DISCHARGE NOTE PROVIDER - NSDCCPTREATMENT_GEN_ALL_CORE_FT
PRINCIPAL PROCEDURE  Procedure: CT chest without contrast  Findings and Treatment: FINDINGS:  LUNGS AND AIRWAYS: Patent central airways.  Centrilobular emphysema.   Bibasilar bandlike atelectasis. The lungs are otherwise clear.  PLEURA: No pleural effusion. No pneumothorax. Diastasis  MEDIASTINUM AND NADEEN: No lymphadenopathy.  VESSELS: Small coronary calcifications. Aortic calcifications. Dilated   pulmonary arteries suggestive of pulmonary hypertension.  HEART: Cardiomegaly. No pericardial effusion.  CHEST WALL AND LOWER NECK: Within normal limits.  VISUALIZED UPPER ABDOMEN: Left anterior abdominal wall diastasis.  BONES: Degenerative changes.  IMPRESSION:  Emphysema. Bibasilar subsegmental atelectasis. The lungs are otherwise  clear.  Recommend annual lung cancer screening with low-dose chest CT if the   patient meets the criteria.        SECONDARY PROCEDURE  Procedure: Transthoracic echocardiography (TTE)  Findings and Treatment: CONCLUSIONS:      1. Left ventricular cavity is normal. Left ventricular wall thickness is normal. Left ventricular systolic function is normal with an ejection fraction of 76 % by Gilbert's method of disks. There are no regional wall motion abnormalities seen.   2. Normal left ventricular diastolic function, with normal filling pressure.   3. The right ventricular cavity is normal in size, normal wall thickness and normal systolic function.   4. There is normal LV mass and concentric remodeling.   5. Normal atria.   6. No significant valvular disease.   7. No pericardial effusion seen.

## 2023-10-29 NOTE — DISCHARGE NOTE PROVIDER - NSDCMRMEDTOKEN_GEN_ALL_CORE_FT
ALBUTEROL(V)  INH:   AMLODIPINE 5MG TAB:   atorvastatin 80 mg oral tablet: 1 tab(s) orally once a day (at bedtime)  CYCLOBENZAPR 10MG TAB:   DULOXETINE 30MG DR CAP:   Eliquis 5 mg oral tablet: 1 tab(s) orally 2 times a day  FLUOXETIN(P) 20MG CAP:   HYDROXYZ HCL 25MG TAB:   insulin aspart 100 units/mL injectable solution: 7 unit(s) injectable 3 times a day (with meals)  insulin glargine 100 units/mL subcutaneous solution: 40 unit(s) subcutaneous once a day (at bedtime)  JARDIANCE 10MG TAB:   methocarbamol 750 mg oral tablet: 2 tab(s) orally 2 times a day as needed for  muscle spasm  METOLAZONE 10MG TAB:   polyethylene glycol 3350 oral powder for reconstitution: 17 each orally once a day  spironolactone 25 mg oral tablet: 2 tab(s) orally once a day  SUMATRIPTAN 50MG TAB:   TADALAFIL 20MG(A) TAB:   torsemide 100 mg oral tablet: 1 tab(s) orally 2 times a day  TRAZODONE 150MG TAB:   TRULICITY(4) 1.5/0.5 PEN:    ALBUTEROL(V)  INH:   AMLODIPINE 5MG TAB:   atorvastatin 80 mg oral tablet: 1 tab(s) orally once a day (at bedtime)  CYCLOBENZAPR 10MG TAB:   DULOXETINE 30MG DR CAP:   Eliquis 5 mg oral tablet: 1 tab(s) orally 2 times a day  FLUOXETIN(P) 20MG CAP:   HYDROXYZ HCL 25MG TAB:   insulin aspart 100 units/mL injectable solution: 7 unit(s) injectable 3 times a day (with meals)  insulin glargine 100 units/mL subcutaneous solution: 40 unit(s) subcutaneous once a day (at bedtime)  JARDIANCE 10MG TAB:   methocarbamol 750 mg oral tablet: 2 tab(s) orally 2 times a day as needed for  muscle spasm  polyethylene glycol 3350 oral powder for reconstitution: 17 each orally once a day  spironolactone 25 mg oral tablet: 2 tab(s) orally once a day  SUMATRIPTAN 50MG TAB:   TADALAFIL 20MG(A) TAB:   tiotropium 2.5 mcg/inh inhalation aerosol: 2 puff(s) inhaled once a day  TRAZODONE 150MG TAB:   TRULICITY(4) 1.5/0.5 PEN:    ALBUTEROL(V)  INH:   AMLODIPINE 5MG TAB:   atorvastatin 80 mg oral tablet: 1 tab(s) orally once a day (at bedtime)  benzonatate 100 mg oral capsule: 1 cap(s) orally every 8 hours  CYCLOBENZAPR 10MG TAB:   DULOXETINE 30MG DR CAP:   Eliquis 5 mg oral tablet: 1 tab(s) orally 2 times a day  FLUOXETIN(P) 20MG CAP:   guaiFENesin 100 mg/5 mL oral liquid: 10 milliliter(s) orally every 6 hours as needed for Cough  HYDROXYZ HCL 25MG TAB:   insulin aspart 100 units/mL injectable solution: 7 unit(s) injectable 3 times a day (with meals)  insulin glargine 100 units/mL subcutaneous solution: 40 unit(s) subcutaneous once a day (at bedtime)  JARDIANCE 10MG TAB:   methocarbamol 750 mg oral tablet: 2 tab(s) orally 2 times a day as needed for  muscle spasm  polyethylene glycol 3350 oral powder for reconstitution: 17 each orally once a day  sodium chloride 0.65% nasal spray: 1 spray(s) nasal 2 times a day as needed for  congestion  spironolactone 25 mg oral tablet: 2 tab(s) orally once a day  SUMATRIPTAN 50MG TAB:   tadalafil 20 mg oral tablet: 1 tab(s) orally once a day  tiotropium 2.5 mcg/inh inhalation aerosol: 2 puff(s) inhaled once a day  torsemide 20 mg oral tablet: 1 tab(s) orally 2 times a day  TRAZODONE 150MG TAB:   TRULICITY(4) 1.5/0.5 PEN:    ALBUTEROL(V)  INH:   AMLODIPINE 5MG TAB:   atorvastatin 80 mg oral tablet: 1 tab(s) orally once a day (at bedtime)  benzonatate 100 mg oral capsule: 1 cap(s) orally every 8 hours  CYCLOBENZAPR 10MG TAB:   DULOXETINE 30MG DR CAP:   Eliquis 5 mg oral tablet: 1 tab(s) orally 2 times a day  FLUOXETIN(P) 20MG CAP:   guaiFENesin 100 mg/5 mL oral liquid: 10 milliliter(s) orally every 6 hours as needed for Cough  HYDROXYZ HCL 25MG TAB:   Incruse Ellipta 62.5 mcg/inh inhalation powder: 1 puff(s) inhaled once a day  insulin aspart 100 units/mL injectable solution: 7 unit(s) injectable 3 times a day (with meals)  insulin glargine 100 units/mL subcutaneous solution: 40 unit(s) subcutaneous once a day (at bedtime)  JARDIANCE 10MG TAB:   methocarbamol 750 mg oral tablet: 2 tab(s) orally 2 times a day as needed for  muscle spasm  polyethylene glycol 3350 oral powder for reconstitution: 17 each orally once a day  sodium chloride 0.65% nasal spray: 1 spray(s) nasal 2 times a day as needed for  congestion  spironolactone 25 mg oral tablet: 2 tab(s) orally once a day  SUMATRIPTAN 50MG TAB:   tadalafil 20 mg oral tablet: 1 tab(s) orally once a day  torsemide 20 mg oral tablet: 1 tab(s) orally 2 times a day  TRAZODONE 150MG TAB:   TRULICITY(4) 1.5/0.5 PEN:

## 2023-10-29 NOTE — DISCHARGE NOTE PROVIDER - NSDCHHBASESERVICE_GEN_ALL_CORE
Nephrology  Patient: Sherly Malcolm Date:2022   : 1939 Attending: Fani Almanza MD   82 year old female        Chief complaint:  CHILANGO, CKD, SOB    HPI:  This is a 82 year old with a past medical history significant for CKD 3, HTN, Schizophrenia,  who presents with c/o increasing swelling of her legs and SOB.     Nephrology was consulted for CHILANGO and hyperkalemia. Cr 1.4 on 22. Creat on admission was 4.3 and serum K on admission was 6.2. She has been on Losartan, Atenolol, K supplement and Spironolactone. Has also been on TSANG 2 inhibitor    Interval history:   22: resting comfortably in bed.  tolerated SLED overnight, 2.5 L removed. Post-sled serum K this AM is 3.9. levophed weaned off, still on vasopressin. Breathing has been stable.   22: no overnight events. Comfortable. Breathing is stable. On room air. Only 165 mL of urine output yesterday. Still on vasopressin   7/15/22: HD tolerated well yesterday, removed 2 kg. Still has swelling but improving.   22: Seen on HD, tolerating well. On room air. Denies SOB. Appears to be making more urine.  22: Tolerated HD well yesterday with 2.5L fluid removal. Sitting up in bed, eating breakfast. On room air. Non-oliguric.   22: last HD was . Resting in bed. Reports chest heaviness and stomach pain. In a. Fib. No AM BMP obtained. Volume status is stable.  1.1 L UOP.   22: comfortable on room air. Denies SOB, abd pain. Renal function holding stable. Creat 1.5 BUN 34. 1 L of urine output yesterday.     Past Medical History:   Diagnosis Date   • Abnormal TSH    • Anxiety    • Arthralgia    • Arthritis     both knees   • Change in bowel habit    • Chronic pain     knees   • CKD (chronic kidney disease) stage 3, GFR 30-59 ml/min (CMS/HCC)    • Compulsive skin picking    • Congestive cardiac failure (CMS/HCC)    • Depression    • Dyslipidemia    • External hemorrhoids    • Family history of colonic polyps    • History of  schizophrenia    • Hypertension    • Intrinsic atopic dermatitis    • Renal insufficiency    • Renovascular hypertension    • Seborrhea capitis    • Urinary tract infection        Past Surgical History:   Procedure Laterality Date   • Colonoscopy diagnostic  11/04/2021    Melanosis coli, diverticulosis, internal hemorrhoids,       Social History     Socioeconomic History   • Marital status: Single     Spouse name: Not on file   • Number of children: Not on file   • Years of education: Not on file   • Highest education level: Not on file   Occupational History   • Not on file   Tobacco Use   • Smoking status: Former Smoker     Packs/day: 0.20     Years: 5.00     Pack years: 1.00     Types: Cigarettes   • Smokeless tobacco: Never Used   • Tobacco comment: 5 cigarettes per  day quit information packed previously   Substance and Sexual Activity   • Alcohol use: Not Currently     Alcohol/week: 0.0 - 1.0 standard drinks     Comment: states she got \"really drunk\" on Thanksgiving and has been drinking since   • Drug use: No   • Sexual activity: Yes     Partners: Male   Other Topics Concern   •  Service Not Asked   • Blood Transfusions Not Asked   • Caffeine Concern Not Asked   • Occupational Exposure Not Asked   • Hobby Hazards Not Asked   • Sleep Concern Not Asked   • Stress Concern Not Asked   • Weight Concern Not Asked   • Special Diet Not Asked   • Back Care Not Asked   • Exercise Not Asked   • Bike Helmet Not Asked   • Seat Belt Yes   • Self-Exams Not Asked   Social History Narrative   • Not on file     Social Determinants of Health     Financial Resource Strain: Not on file   Food Insecurity: Not on file   Transportation Needs: Not on file   Physical Activity: Not on file   Stress: Not on file   Social Connections: Not on file   Intimate Partner Violence: Not At Risk   • Social Determinants: Intimate Partner Violence Past Fear: No   • Social Determinants: Intimate Partner Violence Current Fear: No       Family  History   Problem Relation Age of Onset   • Diabetes Mother        ALLERGIES:  No Known Allergies      Review of Systems:  Positives stated above in HPI.  Full ROS completed and is otherwise negative.       Vital Last Value 24 Hour Range   Temperature 97.5 °F (36.4 °C) Temp  Min: 97.5 °F (36.4 °C)  Max: 98.1 °F (36.7 °C)   Pulse 100 Pulse  Min: 77  Max: 103   Respiratory (!) 26 Resp  Min: 16  Max: 29   Blood Pressure 124/70 BP  Min: 107/73  Max: 134/60   Art /65 No data recorded   Pulse Oximetry 100 % SpO2  Min: 92 %  Max: 100 %     Vital Today Admitted   Weight 78.4 kg (172 lb 13.5 oz) Weight: 82.4 kg (181 lb 10.5 oz)   Height N/A Height: 5' 5\" (165.1 cm)   BMI N/A BMI (Calculated): 27.7     Weight over the past 48 Hours:  Patient Vitals for the past 48 hrs:   Weight   07/18/22 0534 80.1 kg (176 lb 9.4 oz)   07/19/22 0600 78.4 kg (172 lb 13.5 oz)        Hemodynamics:      Last Value 24 Hour Range   CVP   No data recorded   PAS/PAD   No data recorded   PCWP   No data recorded   CO   No data recorded   CI   No data recorded   SVR   No data recorded   SV02   No data recorded     Intake/Output:    Last Stool Occurrence: 1 (07/19/22 1049)    I/O this shift:  In: 120 [P.O.:120]  Out: -     I/O last 3 completed shifts:  In: 550 [P.O.:550]  Out: 1000 [Urine:1000]      Intake/Output Summary (Last 24 hours) at 7/19/2022 1243  Last data filed at 7/19/2022 1049  Gross per 24 hour   Intake 470 ml   Output 900 ml   Net -430 ml       Medications/Infusions:  Medications Prior to Admission   Medication Sig Dispense Refill   • hydroCORTisone (CORTIZONE) 2.5 % lotion      • ketoconazole (NIZORAL) 2 % cream MIX A PEA SIZED AMOUNT OF ALL 3 MEDICATIONS AND APPLY TO THE AFFECTED AREA TWICE DAILY THEN OCCLUDE     • mupirocin (BACTROBAN) 2 % ointment      • allopurinol (ZYLOPRIM) 300 MG tablet Take 300 mg by mouth daily.     • Eliquis 5 MG Tab TAKE 1 TABLET BY MOUTH EVERY 12 HOURS (Patient taking differently: Take 5 mg by mouth every  12 hours.) 60 tablet 5   • [DISCONTINUED] cephalexin (KEFLEX) 500 MG capsule 2t po qam and 1t po every afternoon for 10 days. Take with food. 30 capsule 0   • camphor-menthol (Sarna) 0.5-0.5 % lotion Apply topically as needed for Itching. 222 mL 0   • oxyCODONE-acetaminophen (Percocet)  MG per tablet Take 1 tablet by mouth every 6 hours as needed for Pain. 120 tablet 0   • Linzess 290 MCG TAKE 1 CAPSULE BY MOUTH DAILY BEFORE BREAKFAST (Patient taking differently: Take 290 mcg by mouth daily (before breakfast).) 30 capsule 5   • [DISCONTINUED] baclofen (LIORESAL) 10 MG tablet TAKE 1 TABLET BY MOUTH TWICE DAILY (Patient taking differently: Take 10 mg by mouth in the morning and 10 mg before bedtime.) 180 tablet 3   • hydroxychloroquine (PLAQUENIL) 200 MG tablet TAKE 1 TABLET BY MOUTH DAILY 90 tablet 3   • folic acid (FOLATE) 1 MG tablet Take 1 tablet by mouth daily. 90 tablet 1   • hydrOXYzine (ATARAX) 10 MG tablet Take 10 mg by mouth every 4 hours as needed for Itching.     • [DISCONTINUED] diazePAM (VALIUM) 2 MG tablet Take 1 tablet by mouth nightly as needed for Anxiety or Sleep. 90 tablet 0   • metOLazone (ZAROXOLYN) 2.5 MG tablet Take 1 tablet by mouth daily. 90 tablet 3   • DULoxetine (CYMBALTA) 30 MG capsule TAKE ONE CAPSULE BY MOUTH TWICE DAILY (Patient taking differently: Take 30 mg by mouth in the morning and 30 mg before bedtime.) 180 capsule 1   • losartan (COZAAR) 50 MG tablet TAKE 1 TABLET BY MOUTH DAILY 90 tablet 1   • atorvastatin (LIPITOR) 10 MG tablet TAKE 1 TABLET BY MOUTH DAILY 90 tablet 3   • atenolol (TENORMIN) 100 MG tablet TAKE 1 TABLET BY MOUTH DAILY 90 tablet 3   • bumetanide (BUMEX) 2 MG tablet Take 1 tablet by mouth daily. 90 tablet 3   • triamcinolone (ARISTOCORT) 0.1 % cream Apply thin layer to affected areas on skin bid prn rash/itch.  Do not use on the face. 454 g 1   • mometasone (ELOCON) 0.1 % ointment Massage thin layer to scalp or ears bid prn rash.  Don't use on face. 45 g 1    • solifenacin (VESICARE) 5 MG tablet Take 1 tablet by mouth daily. 90 tablet 3   • fluticasone (CUTIVATE) 0.05 % cream Apply thin layer to face once a day prn itch or skin irritation. 30 g 1   • ketoconazole (Nizoral) 2 % shampoo Apply as directed 3 x a week 120 mL 3   • Triamcinolone Acetonide (TRIAMCINOLONE 0.1% IN EUCERIN) Apply to affected area up to 3 x a day 80 g 2   • clobetasol (TEMOVATE) 0.05 % ointment Apply 1 application topically as needed.  1   • Cholecalciferol (VITAMIN D) 2000 UNITS Cap Take 1 capsule by mouth daily. 90 capsule 3     Current Facility-Administered Medications   Medication Dose Route Frequency Provider Last Rate Last Admin   • apixaBAN (ELIQUIS) tablet 2.5 mg  2.5 mg Oral 2 times per day Celine Bill MD   2.5 mg at 07/19/22 0907   • furosemide (LASIX INJECT) injection 40 mg  40 mg Intravenous Daily BRONSON Beck   40 mg at 07/19/22 0909   • polyethylene glycol (MIRALAX) packet 17 g  17 g Oral Daily Mercy Reynolds MD   17 g at 07/19/22 0913   • pantoprazole (PROTONIX) EC tablet 40 mg  40 mg Oral QAM AC Mercy Reynolds MD   40 mg at 07/19/22 0500   • midodrine (PROAMATINE) tablet 5 mg  5 mg Oral TID AC Jennifer Rogers PA-C   5 mg at 07/19/22 1214   • folic acid (FOLATE) tablet 1 mg  1 mg Oral Daily OLAMIDE Grande   1 mg at 07/19/22 0904   • senna (SENOKOT) 8.6 mg  1 tablet Oral BID OLAMIDE Grande   8.6 mg at 07/19/22 0906   • [Held by provider] baclofen (LIORESAL) tablet 5 mg  5 mg Oral BID Rick ZHONG MD       • acetaminophen (TYLENOL) tablet 650 mg  650 mg Oral TID Jerad Wadsworth MD   650 mg at 07/19/22 0902   • melatonin tablet 6 mg  6 mg Oral Nightly Jerad Wadsworth MD   6 mg at 07/18/22 2022   • sodium chloride (PF) 0.9 % injection 2 mL  2 mL Intracatheter 2 times per day Finn Roper DO   2 mL at 07/19/22 0926     Current Facility-Administered Medications   Medication Dose Route Frequency Provider Last Rate Last Admin   • [Held by provider]  DOBUTamine (DOBUTREX) 500 mg/250 mL dextrose 5 % infusion  5 mcg/kg/min (Dosing Weight) Intravenous Continuous Finn A Hossain, DO   Stopped at 07/12/22 1459   • sodium chloride 0.9% infusion   Intravenous Continuous PRN Finn A Hossain, DO   Completed at 07/17/22 1402   • sodium chloride 0.9% infusion   Intravenous Continuous PRN Finn A Hossain, DO   Completed at 07/18/22 1200       Physical Exam:  General: Alert, no acute distress  HEENT: Head atraumatic, normocephalic.  Moist oral mucus membranes.  Sclera non-icteric.   Neck: Supple. Trachea midline.  Chest/Lungs: Normal effort.  Symmetrical expansion.  Clear to auscultation.  No wheezes, rales or rhonchi.  CVS: Regular rate and rhythm. S1,S2 well heard. There is no S3 or S4 gallop. Has no pericardial rub heard.  Abdomen: BS well heard. Soft, non tender, non distended.  Neuro: No focal neurological deficit.  A&O x 3.   Skin: Warm, dry, intact.  No rashes.   Extremities: No clubbing or cyanosis. + BLE edema.    + right dialysis catheter      Laboratory Results:  Recent Labs   Lab 07/19/22  0456 07/18/22  0449 07/17/22  0311 07/16/22  0309 07/15/22  0401 07/14/22  0433 07/13/22  0450   SODIUM 139 138 137   < > 138 130* 133*   POTASSIUM 4.3 4.6  4.6 4.6   < > 4.2 4.2 3.9   CHLORIDE 105 105 105   < > 105 104 102   CO2 30 27 29   < > 27 25 25   ANIONGAP 8 11 8   < > 10 5* 10   BUN 34* 29* 20   < > 14 27* 18   CREATININE 1.45* 1.53* 1.33*   < > 1.29* 1.61* 0.79   GLUCOSE 89 84 87   < > 85 118* 124*   CALCIUM 9.1 9.0 8.7   < > 8.5 8.0* 8.1*   ALBUMIN  --   --   --   --   --  3.2* 3.5*   PHOS  --   --   --   --   --   --  1.8*   MG  --   --   --   --  2.4  --  2.5*   AST  --   --   --   --   --  24 29   GPT  --   --   --   --   --  18 20   ALKPT  --   --   --   --   --  85 102   BILIRUBIN  --   --   --   --   --  0.3 0.5    < > = values in this interval not displayed.       Recent Labs   Lab 07/19/22  0456 07/18/22  0449 07/17/22  0311   WBC 4.8 5.7 6.6   HGB 10.2*  10.1* 10.4*   HCT 31.6* 32.1* 31.6*   * 120* 108*       No results found    No results found    Urine Panel  No results found    Imaging/Procedures:  Reviewed      Impression, Plan & Recommendations:    · Acute renal failure :   · Etiology: Cardiorenal, plus hemodynamic from hypotension  · SLED initiated 7/11 due to pre-renal azotemia, life-threatening hyperkalemia, and metabolic acidosis   · Transitioned to iHD 7/14. Subsequent HD Sat 7/16  · Last HD was 7/16/22. Monitoring for renal recovery. Nonoliguric. Lytes stable. Creat stabilizing around 1.5. No need for HD today. Will monitor for one more day and if stable can remove catheter.   · Continue with IV lasix  · Access: temp catheter placed 7/11/22  · Risks and benefits explained. She is agreeable to dialysis and verbal consent was obtained.    · CKD 3  · Baseline creatinine 1.3-1.4 mg/dL however labs in system are limited.  · Hyperkalemia - resolved  · Due to use of Losartan, K supplement, Spironolactone, BB and use of TSANG 2 inhibitor  · Received lokelma on admission.   · Improved with RRT   · Metabolic acidosis  · Due to CKD and possible lactic acidosis   · Improved with RRT    · Acute on chronic HF / severe MR  · ECHO: EF 65%, normal LV size/function, RV enlargement with normal function, severe mitral regurgitation  · Continue lasix 40 mg IV daily  · Hypotension   · BP stable on midodrine      Patient care in collaboration with Dr. Hussein Rogers PA-C   Nursing

## 2023-10-30 LAB
ALBUMIN SERPL ELPH-MCNC: 3.8 G/DL — SIGNIFICANT CHANGE UP (ref 3.3–5)
ALBUMIN SERPL ELPH-MCNC: 3.8 G/DL — SIGNIFICANT CHANGE UP (ref 3.3–5)
ALP SERPL-CCNC: 85 U/L — SIGNIFICANT CHANGE UP (ref 40–120)
ALP SERPL-CCNC: 85 U/L — SIGNIFICANT CHANGE UP (ref 40–120)
ALT FLD-CCNC: 9 U/L — SIGNIFICANT CHANGE UP (ref 4–33)
ALT FLD-CCNC: 9 U/L — SIGNIFICANT CHANGE UP (ref 4–33)
ANION GAP SERPL CALC-SCNC: 12 MMOL/L — SIGNIFICANT CHANGE UP (ref 7–14)
ANION GAP SERPL CALC-SCNC: 12 MMOL/L — SIGNIFICANT CHANGE UP (ref 7–14)
AST SERPL-CCNC: 9 U/L — SIGNIFICANT CHANGE UP (ref 4–32)
AST SERPL-CCNC: 9 U/L — SIGNIFICANT CHANGE UP (ref 4–32)
BASOPHILS # BLD AUTO: 0.02 K/UL — SIGNIFICANT CHANGE UP (ref 0–0.2)
BASOPHILS # BLD AUTO: 0.02 K/UL — SIGNIFICANT CHANGE UP (ref 0–0.2)
BASOPHILS NFR BLD AUTO: 0.2 % — SIGNIFICANT CHANGE UP (ref 0–2)
BASOPHILS NFR BLD AUTO: 0.2 % — SIGNIFICANT CHANGE UP (ref 0–2)
BILIRUB SERPL-MCNC: 0.5 MG/DL — SIGNIFICANT CHANGE UP (ref 0.2–1.2)
BILIRUB SERPL-MCNC: 0.5 MG/DL — SIGNIFICANT CHANGE UP (ref 0.2–1.2)
BUN SERPL-MCNC: 68 MG/DL — HIGH (ref 7–23)
BUN SERPL-MCNC: 68 MG/DL — HIGH (ref 7–23)
CALCIUM SERPL-MCNC: 8.9 MG/DL — SIGNIFICANT CHANGE UP (ref 8.4–10.5)
CALCIUM SERPL-MCNC: 8.9 MG/DL — SIGNIFICANT CHANGE UP (ref 8.4–10.5)
CHLORIDE SERPL-SCNC: 96 MMOL/L — LOW (ref 98–107)
CHLORIDE SERPL-SCNC: 96 MMOL/L — LOW (ref 98–107)
CO2 SERPL-SCNC: 27 MMOL/L — SIGNIFICANT CHANGE UP (ref 22–31)
CO2 SERPL-SCNC: 27 MMOL/L — SIGNIFICANT CHANGE UP (ref 22–31)
CREAT SERPL-MCNC: 1.15 MG/DL — SIGNIFICANT CHANGE UP (ref 0.5–1.3)
CREAT SERPL-MCNC: 1.15 MG/DL — SIGNIFICANT CHANGE UP (ref 0.5–1.3)
EGFR: 52 ML/MIN/1.73M2 — LOW
EGFR: 52 ML/MIN/1.73M2 — LOW
EOSINOPHIL # BLD AUTO: 0 K/UL — SIGNIFICANT CHANGE UP (ref 0–0.5)
EOSINOPHIL # BLD AUTO: 0 K/UL — SIGNIFICANT CHANGE UP (ref 0–0.5)
EOSINOPHIL NFR BLD AUTO: 0 % — SIGNIFICANT CHANGE UP (ref 0–6)
EOSINOPHIL NFR BLD AUTO: 0 % — SIGNIFICANT CHANGE UP (ref 0–6)
GLUCOSE BLDC GLUCOMTR-MCNC: 170 MG/DL — HIGH (ref 70–99)
GLUCOSE BLDC GLUCOMTR-MCNC: 170 MG/DL — HIGH (ref 70–99)
GLUCOSE BLDC GLUCOMTR-MCNC: 192 MG/DL — HIGH (ref 70–99)
GLUCOSE BLDC GLUCOMTR-MCNC: 192 MG/DL — HIGH (ref 70–99)
GLUCOSE BLDC GLUCOMTR-MCNC: 230 MG/DL — HIGH (ref 70–99)
GLUCOSE BLDC GLUCOMTR-MCNC: 230 MG/DL — HIGH (ref 70–99)
GLUCOSE BLDC GLUCOMTR-MCNC: 244 MG/DL — HIGH (ref 70–99)
GLUCOSE BLDC GLUCOMTR-MCNC: 244 MG/DL — HIGH (ref 70–99)
GLUCOSE SERPL-MCNC: 305 MG/DL — HIGH (ref 70–99)
GLUCOSE SERPL-MCNC: 305 MG/DL — HIGH (ref 70–99)
HCT VFR BLD CALC: 48.8 % — HIGH (ref 34.5–45)
HCT VFR BLD CALC: 48.8 % — HIGH (ref 34.5–45)
HGB BLD-MCNC: 15.6 G/DL — HIGH (ref 11.5–15.5)
HGB BLD-MCNC: 15.6 G/DL — HIGH (ref 11.5–15.5)
IANC: 8.59 K/UL — HIGH (ref 1.8–7.4)
IANC: 8.59 K/UL — HIGH (ref 1.8–7.4)
IMM GRANULOCYTES NFR BLD AUTO: 0.4 % — SIGNIFICANT CHANGE UP (ref 0–0.9)
IMM GRANULOCYTES NFR BLD AUTO: 0.4 % — SIGNIFICANT CHANGE UP (ref 0–0.9)
LYMPHOCYTES # BLD AUTO: 0.69 K/UL — LOW (ref 1–3.3)
LYMPHOCYTES # BLD AUTO: 0.69 K/UL — LOW (ref 1–3.3)
LYMPHOCYTES # BLD AUTO: 7.2 % — LOW (ref 13–44)
LYMPHOCYTES # BLD AUTO: 7.2 % — LOW (ref 13–44)
MCHC RBC-ENTMCNC: 29.4 PG — SIGNIFICANT CHANGE UP (ref 27–34)
MCHC RBC-ENTMCNC: 29.4 PG — SIGNIFICANT CHANGE UP (ref 27–34)
MCHC RBC-ENTMCNC: 32 GM/DL — SIGNIFICANT CHANGE UP (ref 32–36)
MCHC RBC-ENTMCNC: 32 GM/DL — SIGNIFICANT CHANGE UP (ref 32–36)
MCV RBC AUTO: 91.9 FL — SIGNIFICANT CHANGE UP (ref 80–100)
MCV RBC AUTO: 91.9 FL — SIGNIFICANT CHANGE UP (ref 80–100)
MONOCYTES # BLD AUTO: 0.21 K/UL — SIGNIFICANT CHANGE UP (ref 0–0.9)
MONOCYTES # BLD AUTO: 0.21 K/UL — SIGNIFICANT CHANGE UP (ref 0–0.9)
MONOCYTES NFR BLD AUTO: 2.2 % — SIGNIFICANT CHANGE UP (ref 2–14)
MONOCYTES NFR BLD AUTO: 2.2 % — SIGNIFICANT CHANGE UP (ref 2–14)
NEUTROPHILS # BLD AUTO: 8.59 K/UL — HIGH (ref 1.8–7.4)
NEUTROPHILS # BLD AUTO: 8.59 K/UL — HIGH (ref 1.8–7.4)
NEUTROPHILS NFR BLD AUTO: 90 % — HIGH (ref 43–77)
NEUTROPHILS NFR BLD AUTO: 90 % — HIGH (ref 43–77)
NRBC # BLD: 0 /100 WBCS — SIGNIFICANT CHANGE UP (ref 0–0)
NRBC # BLD: 0 /100 WBCS — SIGNIFICANT CHANGE UP (ref 0–0)
NRBC # FLD: 0 K/UL — SIGNIFICANT CHANGE UP (ref 0–0)
NRBC # FLD: 0 K/UL — SIGNIFICANT CHANGE UP (ref 0–0)
PLATELET # BLD AUTO: 229 K/UL — SIGNIFICANT CHANGE UP (ref 150–400)
PLATELET # BLD AUTO: 229 K/UL — SIGNIFICANT CHANGE UP (ref 150–400)
POTASSIUM SERPL-MCNC: 3.9 MMOL/L — SIGNIFICANT CHANGE UP (ref 3.5–5.3)
POTASSIUM SERPL-MCNC: 3.9 MMOL/L — SIGNIFICANT CHANGE UP (ref 3.5–5.3)
POTASSIUM SERPL-SCNC: 3.9 MMOL/L — SIGNIFICANT CHANGE UP (ref 3.5–5.3)
POTASSIUM SERPL-SCNC: 3.9 MMOL/L — SIGNIFICANT CHANGE UP (ref 3.5–5.3)
PROT SERPL-MCNC: 7.5 G/DL — SIGNIFICANT CHANGE UP (ref 6–8.3)
PROT SERPL-MCNC: 7.5 G/DL — SIGNIFICANT CHANGE UP (ref 6–8.3)
RBC # BLD: 5.31 M/UL — HIGH (ref 3.8–5.2)
RBC # BLD: 5.31 M/UL — HIGH (ref 3.8–5.2)
RBC # FLD: 13.6 % — SIGNIFICANT CHANGE UP (ref 10.3–14.5)
RBC # FLD: 13.6 % — SIGNIFICANT CHANGE UP (ref 10.3–14.5)
SODIUM SERPL-SCNC: 135 MMOL/L — SIGNIFICANT CHANGE UP (ref 135–145)
SODIUM SERPL-SCNC: 135 MMOL/L — SIGNIFICANT CHANGE UP (ref 135–145)
WBC # BLD: 9.55 K/UL — SIGNIFICANT CHANGE UP (ref 3.8–10.5)
WBC # BLD: 9.55 K/UL — SIGNIFICANT CHANGE UP (ref 3.8–10.5)
WBC # FLD AUTO: 9.55 K/UL — SIGNIFICANT CHANGE UP (ref 3.8–10.5)
WBC # FLD AUTO: 9.55 K/UL — SIGNIFICANT CHANGE UP (ref 3.8–10.5)

## 2023-10-30 PROCEDURE — 99233 SBSQ HOSP IP/OBS HIGH 50: CPT

## 2023-10-30 PROCEDURE — 99232 SBSQ HOSP IP/OBS MODERATE 35: CPT

## 2023-10-30 RX ADMIN — APIXABAN 5 MILLIGRAM(S): 2.5 TABLET, FILM COATED ORAL at 17:27

## 2023-10-30 RX ADMIN — Medication 12 UNIT(S): at 08:40

## 2023-10-30 RX ADMIN — Medication 2: at 12:31

## 2023-10-30 RX ADMIN — Medication 3 MILLIGRAM(S): at 22:04

## 2023-10-30 RX ADMIN — Medication 1: at 08:40

## 2023-10-30 RX ADMIN — Medication 1 PACKET(S): at 12:34

## 2023-10-30 RX ADMIN — DULOXETINE HYDROCHLORIDE 30 MILLIGRAM(S): 30 CAPSULE, DELAYED RELEASE ORAL at 12:35

## 2023-10-30 RX ADMIN — Medication 100 MILLIGRAM(S): at 13:15

## 2023-10-30 RX ADMIN — AZITHROMYCIN 500 MILLIGRAM(S): 500 TABLET, FILM COATED ORAL at 12:35

## 2023-10-30 RX ADMIN — TIOTROPIUM BROMIDE 2 PUFF(S): 18 CAPSULE ORAL; RESPIRATORY (INHALATION) at 10:34

## 2023-10-30 RX ADMIN — Medication 40 MILLIGRAM(S): at 05:58

## 2023-10-30 RX ADMIN — ATORVASTATIN CALCIUM 80 MILLIGRAM(S): 80 TABLET, FILM COATED ORAL at 22:04

## 2023-10-30 RX ADMIN — INSULIN GLARGINE 40 UNIT(S): 100 INJECTION, SOLUTION SUBCUTANEOUS at 22:03

## 2023-10-30 RX ADMIN — TADALAFIL 20 MILLIGRAM(S): 10 TABLET, FILM COATED ORAL at 12:36

## 2023-10-30 RX ADMIN — Medication 150 MILLIGRAM(S): at 22:04

## 2023-10-30 RX ADMIN — Medication 12 UNIT(S): at 12:32

## 2023-10-30 RX ADMIN — Medication 20 MILLIGRAM(S): at 12:35

## 2023-10-30 RX ADMIN — Medication 12 UNIT(S): at 17:27

## 2023-10-30 RX ADMIN — Medication 1: at 17:27

## 2023-10-30 RX ADMIN — Medication 100 MILLIGRAM(S): at 22:04

## 2023-10-30 RX ADMIN — APIXABAN 5 MILLIGRAM(S): 2.5 TABLET, FILM COATED ORAL at 05:57

## 2023-10-30 RX ADMIN — Medication 100 MILLIGRAM(S): at 05:58

## 2023-10-30 RX ADMIN — Medication 25 MILLIGRAM(S): at 01:00

## 2023-10-30 RX ADMIN — AMLODIPINE BESYLATE 5 MILLIGRAM(S): 2.5 TABLET ORAL at 05:58

## 2023-10-30 NOTE — PROGRESS NOTE ADULT - ASSESSMENT
67 y/o F w/ hx of uncontrolled Type 2 DM w/ hyperglycemia complicated by neuropathy, HTN, HLD now with hyperglycemia exacerbated by prednisone   (high risk patient with severe hyperglycemia with glucose values > 300's at high risk of CAD and CVA with high level decision-making).      Problem/Recommendation - 1:  ·  Problem: Type 2 diabetes mellitus with hyperglycemia, with long-term current use of insulin.   ·  Recommendation: Diabetes Education and Nutrition Eval    10/30  DM: home regimen  . At home on Lantus 40 untis daily and Novolog 7 units with meals. Rarely misses a dose. Also on Trulicity 1.5mg weekly and Jardiance 25mg daily. Now has Martin to check glucose.   last dose of pred yesterday- will see effect today and tomorrow   c/w Lantus 40 units (home regimen)  c/w  Admelog 12 units qac---> if FSBG <150 lower to ademelog 8 units premeals   Low correction scale qac + bedtime  Goal glucose 100-180  Outpt. endo follow-up  Outpt. optho, podiatry, nephrology  Plan to d/c on home regimen of basal bolus + trulicty- assess SGLT2 restart based on kidney funtion      Problem/Recommendation - 2:  ·  Problem: Essential hypertension.   ·  Recommendation: Goal BP < 130/80 c/w amlodipine.  outpt mc/cr ratio      Problem/Recommendation - 3:  ·  Problem: Hyperlipidemia.   ·  Recommendation: c/w statin  outpt lipid panel to be monitored                     35 minutes with greater than 50% of time spent counseling / coordinating care for uncontrolled diabetes.  Discharge planning   education with patient regarding appropriate basal / bolus administration   Discharge plan  Follow up         Melinda Duke MD  Attending Physician   Department of Endocrinology, Diabetes and Metabolism     weekdays: 9am to 5pm: email [facility] or [facility] and or TEAMS     Nights and weekends: [phone]  Please note that this patient may be followed by a different provider tomorrow.   If no answer or after hours, please contact [phone].  For final dc reccomendations, please call [phone] or page the endocrine fellow on call.

## 2023-10-30 NOTE — PROGRESS NOTE ADULT - PROBLEM SELECTOR PLAN 1
Hx of pHTN at home 2L O2 at rest, 3L with activity. Follows with Dr. Recio at [facility]. SOB after walking <1 block at baseline. On albuterol prn for COPD and tadalafil for pHTN. Not adherent to home Bipap 15/5 for WALTER. Reports ?2 hospitalizations for "breathing", 1 in 2019 and 1 last week, but unclear if actually for COPD as pt poor historian and reports she was there for different complaints as well (eg abdominal pain from ventral hernias). Now presented with wheezing, green phlegm with cough, SOB, requiring 6L in ED.  - discussed with Dr. Recio, pt very poorly adherent to medications and office visits. PASP 1.5 years ago 71. He is agreement to treat for COPD exacerbation, start on COPD meds and f/u o/p when stable for d/c  - RVP/COVID neg  - CO2 69, not acidemic  - CXR with emphysema but no acute infiltrate or pulmonary edema noted  - CT chest: emphysema and atelectasis without focal consolidation  -   - TTE without HFrEF or HFpEF  Plan:  - presentation c/w with COPD exacerbation, likely in the setting of minimal medication/CPAP adherence. will treat for pneumonia as pt admitted with COPD exacerbation. however, will only treat for CAP as it is unclear if pt received abx at recent admission and low c/f severe pneumonia at this time given lack of leukocytosis or fever. less likely CHF exacerbation as pt does not appear fluid overloaded with low age-adjusted BNP  - s/p dex, will continue pred 40mg qd for total 5 day course to end 10/29  - duonebs q6 prn  - started tiotropium  - d/c'ed ceftriaxone as no consolidation on CT chest and normal sputum cx  - continue azithromycin x5 days

## 2023-10-30 NOTE — PROGRESS NOTE ADULT - SUBJECTIVE AND OBJECTIVE BOX
Chief Complaint/Follow-up on:   DM    Subjective:      POC blood glucose and insulin use reviewed.  pt feels well   no n/v  tolerating PO   we spoke about inpt DM management goals and monitoring and also the need to fu outpt for DM       she believes she ate before breakfast this am- prob could have led to the elevated BG in 300s       MEDICATIONS  (STANDING):  amLODIPine   Tablet 5 milliGRAM(s) Oral daily  apixaban 5 milliGRAM(s) Oral two times a day  atorvastatin 80 milliGRAM(s) Oral at bedtime  azithromycin   Tablet 500 milliGRAM(s) Oral daily  benzonatate 100 milliGRAM(s) Oral every 8 hours  dextrose 5%. 1000 milliLiter(s) (50 mL/Hr) IV Continuous <Continuous>  dextrose 5%. 1000 milliLiter(s) (100 mL/Hr) IV Continuous <Continuous>  dextrose 50% Injectable 25 Gram(s) IV Push once  dextrose 50% Injectable 12.5 Gram(s) IV Push once  dextrose 50% Injectable 25 Gram(s) IV Push once  DULoxetine 30 milliGRAM(s) Oral daily  FLUoxetine 20 milliGRAM(s) Oral daily  glucagon  Injectable 1 milliGRAM(s) IntraMuscular once  influenza  Vaccine (HIGH DOSE) 0.7 milliLiter(s) IntraMuscular once  insulin glargine Injectable (LANTUS) 40 Unit(s) SubCutaneous at bedtime  insulin lispro (ADMELOG) corrective regimen sliding scale   SubCutaneous three times a day before meals  insulin lispro (ADMELOG) corrective regimen sliding scale   SubCutaneous at bedtime  insulin lispro Injectable (ADMELOG) 12 Unit(s) SubCutaneous three times a day before meals  lactated ringers. 1000 milliLiter(s) (100 mL/Hr) IV Continuous <Continuous>  lactulose Syrup 10 Gram(s) Oral two times a day  polyethylene glycol 3350 17 Gram(s) Oral daily  psyllium Powder 1 Packet(s) Oral daily  senna 2 Tablet(s) Oral at bedtime  tadalafil 20 milliGRAM(s) Oral daily  tiotropium 2.5 MICROgram(s) Inhaler 2 Puff(s) Inhalation daily  traZODone 150 milliGRAM(s) Oral at bedtime    MEDICATIONS  (PRN):  acetaminophen     Tablet .. 650 milliGRAM(s) Oral every 6 hours PRN Temp greater or equal to 38C (100.4F), Mild Pain (1 - 3)  albuterol/ipratropium for Nebulization 3 milliLiter(s) Nebulizer every 6 hours PRN Shortness of Breath and/or Wheezing  cyclobenzaprine 10 milliGRAM(s) Oral three times a day PRN Muscle Spasm  dextrose Oral Gel 15 Gram(s) Oral once PRN Blood Glucose LESS THAN 70 milliGRAM(s)/deciliter  guaiFENesin Oral Liquid (Sugar-Free) 200 milliGRAM(s) Oral every 6 hours PRN Cough  hydrOXYzine hydrochloride 25 milliGRAM(s) Oral two times a day PRN Itching  melatonin 3 milliGRAM(s) Oral at bedtime PRN Insomnia  methocarbamol 750 milliGRAM(s) Oral three times a day PRN Muscle Spasm      PHYSICAL EXAM:  VITALS: T(C): 36.9 (10-30-23 @ 11:46)  T(F): 98.5 (10-30-23 @ 11:46), Max: 98.5 (10-30-23 @ 02:06)  HR: 88 (10-30-23 @ 11:46) (82 - 88)  BP: 122/65 (10-30-23 @ 11:46) (109/61 - 122/76)  RR:  (16 - 18)  SpO2:  (96% - 98%)  Wt(kg): --  GENERAL: NAD, well-groomed, well-developed  EYES: No proptosis, no injection  HEENT:  Atraumatic, Normocephalic, moist mucous membranes  RESPIRATORY: Clear to auscultation bilaterally; No rales, rhonchi, wheezing, or rubs  CARDIOVASCULAR: Regular rate and rhythm; No murmurs; no peripheral edema  GI: Soft, nontender, non distended, normal bowel sounds      POCT Blood Glucose.: 244 mg/dL (10-30-23 @ 12:25)  POCT Blood Glucose.: 170 mg/dL (10-30-23 @ 08:22)  POCT Blood Glucose.: 259 mg/dL (10-29-23 @ 21:58)  POCT Blood Glucose.: 237 mg/dL (10-29-23 @ 17:13)  POCT Blood Glucose.: 216 mg/dL (10-29-23 @ 12:01)  POCT Blood Glucose.: 128 mg/dL (10-29-23 @ 08:12)  POCT Blood Glucose.: 208 mg/dL (10-28-23 @ 21:33)  POCT Blood Glucose.: 330 mg/dL (10-28-23 @ 16:45)  POCT Blood Glucose.: 334 mg/dL (10-28-23 @ 12:33)  POCT Blood Glucose.: 237 mg/dL (10-28-23 @ 08:35)  POCT Blood Glucose.: 276 mg/dL (10-27-23 @ 21:32)  POCT Blood Glucose.: 345 mg/dL (10-27-23 @ 17:25)    10-30    135  |  96<L>  |  68<H>  ----------------------------<  305<H>  3.9   |  27  |  1.15    eGFR: 52<L>    Ca    8.9      10-30  Mg     2.80     10-29  Phos  4.8     10-29    TPro  7.5  /  Alb  3.8  /  TBili  0.5  /  DBili  x   /  AST  9   /  ALT  9   /  AlkPhos  85  10-30          Thyroid Function Tests:

## 2023-10-30 NOTE — PROGRESS NOTE ADULT - SUBJECTIVE AND OBJECTIVE BOX
Danielle Deleon MD   [facility]  Teams preferred  Pager: [phone]    Patient is a 68y old  Female who presents with a chief complaint of cough, CP, SOB x1 week (30 Oct 2023 15:47)      INTERVAL HPI/OVERNIGHT EVENTS: no issues overnight. reports breathing is improved. still having cp 8/10. states improves with inhalers. also complaining of LE pain    MEDICATIONS  (STANDING):  amLODIPine   Tablet 5 milliGRAM(s) Oral daily  apixaban 5 milliGRAM(s) Oral two times a day  atorvastatin 80 milliGRAM(s) Oral at bedtime  azithromycin   Tablet 500 milliGRAM(s) Oral daily  benzonatate 100 milliGRAM(s) Oral every 8 hours  dextrose 5%. 1000 milliLiter(s) (50 mL/Hr) IV Continuous <Continuous>  dextrose 5%. 1000 milliLiter(s) (100 mL/Hr) IV Continuous <Continuous>  dextrose 50% Injectable 25 Gram(s) IV Push once  dextrose 50% Injectable 12.5 Gram(s) IV Push once  dextrose 50% Injectable 25 Gram(s) IV Push once  DULoxetine 30 milliGRAM(s) Oral daily  FLUoxetine 20 milliGRAM(s) Oral daily  glucagon  Injectable 1 milliGRAM(s) IntraMuscular once  influenza  Vaccine (HIGH DOSE) 0.7 milliLiter(s) IntraMuscular once  insulin glargine Injectable (LANTUS) 40 Unit(s) SubCutaneous at bedtime  insulin lispro (ADMELOG) corrective regimen sliding scale   SubCutaneous three times a day before meals  insulin lispro (ADMELOG) corrective regimen sliding scale   SubCutaneous at bedtime  insulin lispro Injectable (ADMELOG) 12 Unit(s) SubCutaneous three times a day before meals  lactated ringers. 1000 milliLiter(s) (100 mL/Hr) IV Continuous <Continuous>  lactulose Syrup 10 Gram(s) Oral two times a day  polyethylene glycol 3350 17 Gram(s) Oral daily  psyllium Powder 1 Packet(s) Oral daily  senna 2 Tablet(s) Oral at bedtime  tadalafil 20 milliGRAM(s) Oral daily  tiotropium 2.5 MICROgram(s) Inhaler 2 Puff(s) Inhalation daily  traZODone 150 milliGRAM(s) Oral at bedtime    MEDICATIONS  (PRN):  acetaminophen     Tablet .. 650 milliGRAM(s) Oral every 6 hours PRN Temp greater or equal to 38C (100.4F), Mild Pain (1 - 3)  albuterol/ipratropium for Nebulization 3 milliLiter(s) Nebulizer every 6 hours PRN Shortness of Breath and/or Wheezing  cyclobenzaprine 10 milliGRAM(s) Oral three times a day PRN Muscle Spasm  dextrose Oral Gel 15 Gram(s) Oral once PRN Blood Glucose LESS THAN 70 milliGRAM(s)/deciliter  guaiFENesin Oral Liquid (Sugar-Free) 200 milliGRAM(s) Oral every 6 hours PRN Cough  hydrOXYzine hydrochloride 25 milliGRAM(s) Oral two times a day PRN Itching  melatonin 3 milliGRAM(s) Oral at bedtime PRN Insomnia  methocarbamol 750 milliGRAM(s) Oral three times a day PRN Muscle Spasm      Allergies    No Known Allergies    Intolerances        REVIEW OF SYSTEMS:  Please see interval HPI:    Vital Signs Last 24 Hrs  T(C): 36.6 (30 Oct 2023 16:34), Max: 36.9 (30 Oct 2023 02:06)  T(F): 97.8 (30 Oct 2023 16:34), Max: 98.5 (30 Oct 2023 02:06)  HR: 80 (30 Oct 2023 16:34) (80 - 88)  BP: 144/86 (30 Oct 2023 16:34) (109/61 - 144/86)  BP(mean): --  RR: 17 (30 Oct 2023 16:34) (16 - 18)  SpO2: 97% (30 Oct 2023 16:34) (96% - 98%)    Parameters below as of 30 Oct 2023 11:46  Patient On (Oxygen Delivery Method): nasal cannula  O2 Flow (L/min): 3    I&O's Detail        PHYSICAL EXAM:  Vital Signs Last 24 Hrs  T(C): 36.6 (30 Oct 2023 16:34), Max: 36.9 (30 Oct 2023 02:06)  T(F): 97.8 (30 Oct 2023 16:34), Max: 98.5 (30 Oct 2023 02:06)  HR: 80 (30 Oct 2023 16:34) (80 - 88)  BP: 144/86 (30 Oct 2023 16:34) (109/61 - 144/86)  BP(mean): --  RR: 17 (30 Oct 2023 16:34) (16 - 18)  SpO2: 97% (30 Oct 2023 16:34) (96% - 98%)    Parameters below as of 30 Oct 2023 11:46  Patient On (Oxygen Delivery Method): nasal cannula  O2 Flow (L/min): 3    CONSTITUTIONAL: NAD,   ENMT: Moist oral mucosa,   RESPIRATORY: Normal respiratory effort; coarse b/l, intermittent expiratory wheeze  CARDIOVASCULAR: Regular rate and rhythm, normal S1 and S2, no murmur/rub/gallop; No lower extremity edema;   ABDOMEN: Nontender to palpation, normoactive bowel sounds, no rebound/guarding; No hepatosplenomegaly  EXT: no edema b/l, tender at calves  NEUROLOGY: alert, following commands  SKIN: No rashes; no palpable lesions      LABS:                        15.6   9.55  )-----------( 229      ( 30 Oct 2023 10:30 )             48.8     30 Oct 2023 10:30    135    |  96     |  68     ----------------------------<  305    3.9     |  27     |  1.15     Ca    8.9        30 Oct 2023 10:30    TPro  7.5    /  Alb  3.8    /  TBili  0.5    /  DBili  x      /  AST  9      /  ALT  9      /  AlkPhos  85     30 Oct 2023 10:30      CAPILLARY BLOOD GLUCOSE      POCT Blood Glucose.: 192 mg/dL (30 Oct 2023 17:21)  POCT Blood Glucose.: 244 mg/dL (30 Oct 2023 12:25)  POCT Blood Glucose.: 170 mg/dL (30 Oct 2023 08:22)  POCT Blood Glucose.: 259 mg/dL (29 Oct 2023 21:58)    BLOOD CULTURE    RADIOLOGY & ADDITIONAL TESTS:    Imaging Personally Reviewed:  [ ] YES     Consultant(s) Notes Reviewed:      Care Discussed with Consultants/Other Providers:

## 2023-10-30 NOTE — PROGRESS NOTE ADULT - PROBLEM SELECTOR PLAN 6
- as above, does not appear to be in acute exacerbation  - s/p metolazone 10mg x1 in ED. Does not take any longer for months. Used to be on 10mg MWF.  - follows [facility]  - TTE without HFpEF or HFrEF  Plan:  - hold home torsemide 100mg BID, иван 25mg BID in the setting of DELORES  - will try to reach out to cards tomorrow

## 2023-10-30 NOTE — PROGRESS NOTE ADULT - PROBLEM SELECTOR PLAN 3
Per o/p pulm, Cr around 1 as of 03/2023  - elevated Cr likely in the setting of diuretics with normal EF  Plan:  - hold torsemide and spironolactone  - renally dose meds  - improving  -can consider small dose of diuretic on discharge

## 2023-10-31 LAB
ALBUMIN SERPL ELPH-MCNC: 3.6 G/DL — SIGNIFICANT CHANGE UP (ref 3.3–5)
ALBUMIN SERPL ELPH-MCNC: 3.6 G/DL — SIGNIFICANT CHANGE UP (ref 3.3–5)
ALP SERPL-CCNC: 87 U/L — SIGNIFICANT CHANGE UP (ref 40–120)
ALP SERPL-CCNC: 87 U/L — SIGNIFICANT CHANGE UP (ref 40–120)
ALT FLD-CCNC: 11 U/L — SIGNIFICANT CHANGE UP (ref 4–33)
ALT FLD-CCNC: 11 U/L — SIGNIFICANT CHANGE UP (ref 4–33)
ANION GAP SERPL CALC-SCNC: 12 MMOL/L — SIGNIFICANT CHANGE UP (ref 7–14)
ANION GAP SERPL CALC-SCNC: 12 MMOL/L — SIGNIFICANT CHANGE UP (ref 7–14)
AST SERPL-CCNC: 10 U/L — SIGNIFICANT CHANGE UP (ref 4–32)
AST SERPL-CCNC: 10 U/L — SIGNIFICANT CHANGE UP (ref 4–32)
BASOPHILS # BLD AUTO: 0.04 K/UL — SIGNIFICANT CHANGE UP (ref 0–0.2)
BASOPHILS # BLD AUTO: 0.04 K/UL — SIGNIFICANT CHANGE UP (ref 0–0.2)
BASOPHILS NFR BLD AUTO: 0.3 % — SIGNIFICANT CHANGE UP (ref 0–2)
BASOPHILS NFR BLD AUTO: 0.3 % — SIGNIFICANT CHANGE UP (ref 0–2)
BILIRUB SERPL-MCNC: 0.4 MG/DL — SIGNIFICANT CHANGE UP (ref 0.2–1.2)
BILIRUB SERPL-MCNC: 0.4 MG/DL — SIGNIFICANT CHANGE UP (ref 0.2–1.2)
BUN SERPL-MCNC: 60 MG/DL — HIGH (ref 7–23)
BUN SERPL-MCNC: 60 MG/DL — HIGH (ref 7–23)
CALCIUM SERPL-MCNC: 8.7 MG/DL — SIGNIFICANT CHANGE UP (ref 8.4–10.5)
CALCIUM SERPL-MCNC: 8.7 MG/DL — SIGNIFICANT CHANGE UP (ref 8.4–10.5)
CHLORIDE SERPL-SCNC: 101 MMOL/L — SIGNIFICANT CHANGE UP (ref 98–107)
CHLORIDE SERPL-SCNC: 101 MMOL/L — SIGNIFICANT CHANGE UP (ref 98–107)
CO2 SERPL-SCNC: 26 MMOL/L — SIGNIFICANT CHANGE UP (ref 22–31)
CO2 SERPL-SCNC: 26 MMOL/L — SIGNIFICANT CHANGE UP (ref 22–31)
CREAT SERPL-MCNC: 1.11 MG/DL — SIGNIFICANT CHANGE UP (ref 0.5–1.3)
CREAT SERPL-MCNC: 1.11 MG/DL — SIGNIFICANT CHANGE UP (ref 0.5–1.3)
CULTURE RESULTS: SIGNIFICANT CHANGE UP
EGFR: 54 ML/MIN/1.73M2 — LOW
EGFR: 54 ML/MIN/1.73M2 — LOW
EOSINOPHIL # BLD AUTO: 0.07 K/UL — SIGNIFICANT CHANGE UP (ref 0–0.5)
EOSINOPHIL # BLD AUTO: 0.07 K/UL — SIGNIFICANT CHANGE UP (ref 0–0.5)
EOSINOPHIL NFR BLD AUTO: 0.6 % — SIGNIFICANT CHANGE UP (ref 0–6)
EOSINOPHIL NFR BLD AUTO: 0.6 % — SIGNIFICANT CHANGE UP (ref 0–6)
GLUCOSE BLDC GLUCOMTR-MCNC: 176 MG/DL — HIGH (ref 70–99)
GLUCOSE BLDC GLUCOMTR-MCNC: 176 MG/DL — HIGH (ref 70–99)
GLUCOSE BLDC GLUCOMTR-MCNC: 207 MG/DL — HIGH (ref 70–99)
GLUCOSE BLDC GLUCOMTR-MCNC: 207 MG/DL — HIGH (ref 70–99)
GLUCOSE BLDC GLUCOMTR-MCNC: 258 MG/DL — HIGH (ref 70–99)
GLUCOSE BLDC GLUCOMTR-MCNC: 258 MG/DL — HIGH (ref 70–99)
GLUCOSE BLDC GLUCOMTR-MCNC: 75 MG/DL — SIGNIFICANT CHANGE UP (ref 70–99)
GLUCOSE BLDC GLUCOMTR-MCNC: 75 MG/DL — SIGNIFICANT CHANGE UP (ref 70–99)
GLUCOSE SERPL-MCNC: 166 MG/DL — HIGH (ref 70–99)
GLUCOSE SERPL-MCNC: 166 MG/DL — HIGH (ref 70–99)
HCT VFR BLD CALC: 48.4 % — HIGH (ref 34.5–45)
HCT VFR BLD CALC: 48.4 % — HIGH (ref 34.5–45)
HGB BLD-MCNC: 15.6 G/DL — HIGH (ref 11.5–15.5)
HGB BLD-MCNC: 15.6 G/DL — HIGH (ref 11.5–15.5)
IANC: 7.95 K/UL — HIGH (ref 1.8–7.4)
IANC: 7.95 K/UL — HIGH (ref 1.8–7.4)
IMM GRANULOCYTES NFR BLD AUTO: 0.4 % — SIGNIFICANT CHANGE UP (ref 0–0.9)
IMM GRANULOCYTES NFR BLD AUTO: 0.4 % — SIGNIFICANT CHANGE UP (ref 0–0.9)
LYMPHOCYTES # BLD AUTO: 2.62 K/UL — SIGNIFICANT CHANGE UP (ref 1–3.3)
LYMPHOCYTES # BLD AUTO: 2.62 K/UL — SIGNIFICANT CHANGE UP (ref 1–3.3)
LYMPHOCYTES # BLD AUTO: 22.1 % — SIGNIFICANT CHANGE UP (ref 13–44)
LYMPHOCYTES # BLD AUTO: 22.1 % — SIGNIFICANT CHANGE UP (ref 13–44)
MCHC RBC-ENTMCNC: 30.1 PG — SIGNIFICANT CHANGE UP (ref 27–34)
MCHC RBC-ENTMCNC: 30.1 PG — SIGNIFICANT CHANGE UP (ref 27–34)
MCHC RBC-ENTMCNC: 32.2 GM/DL — SIGNIFICANT CHANGE UP (ref 32–36)
MCHC RBC-ENTMCNC: 32.2 GM/DL — SIGNIFICANT CHANGE UP (ref 32–36)
MCV RBC AUTO: 93.3 FL — SIGNIFICANT CHANGE UP (ref 80–100)
MCV RBC AUTO: 93.3 FL — SIGNIFICANT CHANGE UP (ref 80–100)
MONOCYTES # BLD AUTO: 1.11 K/UL — HIGH (ref 0–0.9)
MONOCYTES # BLD AUTO: 1.11 K/UL — HIGH (ref 0–0.9)
MONOCYTES NFR BLD AUTO: 9.4 % — SIGNIFICANT CHANGE UP (ref 2–14)
MONOCYTES NFR BLD AUTO: 9.4 % — SIGNIFICANT CHANGE UP (ref 2–14)
NEUTROPHILS # BLD AUTO: 7.95 K/UL — HIGH (ref 1.8–7.4)
NEUTROPHILS # BLD AUTO: 7.95 K/UL — HIGH (ref 1.8–7.4)
NEUTROPHILS NFR BLD AUTO: 67.2 % — SIGNIFICANT CHANGE UP (ref 43–77)
NEUTROPHILS NFR BLD AUTO: 67.2 % — SIGNIFICANT CHANGE UP (ref 43–77)
NRBC # BLD: 0 /100 WBCS — SIGNIFICANT CHANGE UP (ref 0–0)
NRBC # BLD: 0 /100 WBCS — SIGNIFICANT CHANGE UP (ref 0–0)
NRBC # FLD: 0 K/UL — SIGNIFICANT CHANGE UP (ref 0–0)
NRBC # FLD: 0 K/UL — SIGNIFICANT CHANGE UP (ref 0–0)
PLATELET # BLD AUTO: 243 K/UL — SIGNIFICANT CHANGE UP (ref 150–400)
PLATELET # BLD AUTO: 243 K/UL — SIGNIFICANT CHANGE UP (ref 150–400)
POTASSIUM SERPL-MCNC: 3.6 MMOL/L — SIGNIFICANT CHANGE UP (ref 3.5–5.3)
POTASSIUM SERPL-MCNC: 3.6 MMOL/L — SIGNIFICANT CHANGE UP (ref 3.5–5.3)
POTASSIUM SERPL-SCNC: 3.6 MMOL/L — SIGNIFICANT CHANGE UP (ref 3.5–5.3)
POTASSIUM SERPL-SCNC: 3.6 MMOL/L — SIGNIFICANT CHANGE UP (ref 3.5–5.3)
PROT SERPL-MCNC: 7.4 G/DL — SIGNIFICANT CHANGE UP (ref 6–8.3)
PROT SERPL-MCNC: 7.4 G/DL — SIGNIFICANT CHANGE UP (ref 6–8.3)
RBC # BLD: 5.19 M/UL — SIGNIFICANT CHANGE UP (ref 3.8–5.2)
RBC # BLD: 5.19 M/UL — SIGNIFICANT CHANGE UP (ref 3.8–5.2)
RBC # FLD: 13.3 % — SIGNIFICANT CHANGE UP (ref 10.3–14.5)
RBC # FLD: 13.3 % — SIGNIFICANT CHANGE UP (ref 10.3–14.5)
SODIUM SERPL-SCNC: 139 MMOL/L — SIGNIFICANT CHANGE UP (ref 135–145)
SODIUM SERPL-SCNC: 139 MMOL/L — SIGNIFICANT CHANGE UP (ref 135–145)
SPECIMEN SOURCE: SIGNIFICANT CHANGE UP
WBC # BLD: 11.84 K/UL — HIGH (ref 3.8–10.5)
WBC # BLD: 11.84 K/UL — HIGH (ref 3.8–10.5)
WBC # FLD AUTO: 11.84 K/UL — HIGH (ref 3.8–10.5)
WBC # FLD AUTO: 11.84 K/UL — HIGH (ref 3.8–10.5)

## 2023-10-31 PROCEDURE — 99232 SBSQ HOSP IP/OBS MODERATE 35: CPT

## 2023-10-31 PROCEDURE — 93010 ELECTROCARDIOGRAM REPORT: CPT

## 2023-10-31 PROCEDURE — 93970 EXTREMITY STUDY: CPT | Mod: 26

## 2023-10-31 RX ORDER — SODIUM CHLORIDE 0.65 %
1 AEROSOL, SPRAY (ML) NASAL ONCE
Refills: 0 | Status: COMPLETED | OUTPATIENT
Start: 2023-10-31 | End: 2023-10-31

## 2023-10-31 RX ADMIN — Medication 20 MILLIGRAM(S): at 12:44

## 2023-10-31 RX ADMIN — Medication 150 MILLIGRAM(S): at 22:02

## 2023-10-31 RX ADMIN — Medication 650 MILLIGRAM(S): at 22:02

## 2023-10-31 RX ADMIN — Medication 1 SPRAY(S): at 22:02

## 2023-10-31 RX ADMIN — DULOXETINE HYDROCHLORIDE 30 MILLIGRAM(S): 30 CAPSULE, DELAYED RELEASE ORAL at 12:44

## 2023-10-31 RX ADMIN — Medication 12 UNIT(S): at 10:57

## 2023-10-31 RX ADMIN — APIXABAN 5 MILLIGRAM(S): 2.5 TABLET, FILM COATED ORAL at 06:30

## 2023-10-31 RX ADMIN — Medication 100 MILLIGRAM(S): at 06:30

## 2023-10-31 RX ADMIN — Medication 650 MILLIGRAM(S): at 22:58

## 2023-10-31 RX ADMIN — Medication 2: at 12:44

## 2023-10-31 RX ADMIN — Medication 100 MILLIGRAM(S): at 22:02

## 2023-10-31 RX ADMIN — Medication 3 MILLILITER(S): at 16:44

## 2023-10-31 RX ADMIN — Medication 12 UNIT(S): at 18:27

## 2023-10-31 RX ADMIN — Medication 100 MILLIGRAM(S): at 12:44

## 2023-10-31 RX ADMIN — Medication 3: at 10:56

## 2023-10-31 RX ADMIN — TIOTROPIUM BROMIDE 2 PUFF(S): 18 CAPSULE ORAL; RESPIRATORY (INHALATION) at 10:57

## 2023-10-31 RX ADMIN — TADALAFIL 20 MILLIGRAM(S): 10 TABLET, FILM COATED ORAL at 14:42

## 2023-10-31 RX ADMIN — Medication 12 UNIT(S): at 12:50

## 2023-10-31 RX ADMIN — AZITHROMYCIN 500 MILLIGRAM(S): 500 TABLET, FILM COATED ORAL at 12:44

## 2023-10-31 RX ADMIN — ATORVASTATIN CALCIUM 80 MILLIGRAM(S): 80 TABLET, FILM COATED ORAL at 22:02

## 2023-10-31 RX ADMIN — APIXABAN 5 MILLIGRAM(S): 2.5 TABLET, FILM COATED ORAL at 17:17

## 2023-10-31 RX ADMIN — INSULIN GLARGINE 40 UNIT(S): 100 INJECTION, SOLUTION SUBCUTANEOUS at 22:04

## 2023-10-31 NOTE — PROGRESS NOTE ADULT - PROBLEM SELECTOR PLAN 7
On glargine 40u qhs, novolog 7u TID, jardiance 10, trulicity  -A1C 6.4  Plan:  - increase to lantus 40u, lispro 12u  - ok to resume jardiance

## 2023-10-31 NOTE — PROGRESS NOTE ADULT - PROBLEM SELECTOR PLAN 6
- as above, does not appear to be in acute exacerbation  - s/p metolazone 10mg x1 in ED. Does not take any longer for months. Used to be on 10mg MWF.  - follows cards [facility]  - TTE without HFpEF or HFrEF  Plan:  - hold home torsemide 100mg BID,  -resume low dose spironolactone

## 2023-10-31 NOTE — PROGRESS NOTE ADULT - ASSESSMENT
67 y/o F w/ hx of uncontrolled Type 2 DM w/ hyperglycemia complicated by neuropathy, HTN, HLD now with hyperglycemia exacerbated by prednisone     1. Type 2 diabetes mellitus with hyperglycemia, with long-term current use of insulin.   A1c 6.4%  Home Regimen: At home on Lantus 40 units daily and Novolog 7 units with meals. Rarely misses a dose. Also on Trulicity 1.5mg weekly and Jardiance 25mg daily. Now has Martin to check glucose.     While inpatient:   BG target 100-180 mg/dl  Although steroids are complete, hyperglycemia persisting. Would continue current dosing and adjust as below if needed   Continue Lantus 40 units SQ qHS   Continue Admelog 12 units before meals --> if FSBG <150 lower to Admelog 8 units premeals   Admelog LOW dose correctional scale before meals, low dose at bedtime  Check BG before meals and bedtime  Consistent carbohydrate diet  Hypoglycemia protocol     Discharge Plan:  Likely home regimen (basal/bolus with doses TBD) plus resume Trulicity 1.5 mg SQ weekly and Jardiance 25 mg PO daily     2. HTN  Goal BP < 130/80  Continue Amlodipine  Outpt mc/cr ratio     3. Hyperlipidemia  LDL target less than 70  Continue Atorvastatin 80 mg daily if no contraindications     Charley Mckeon  Nurse Practitioner  Division of Endocrinology & Diabetes  In house pager #[phone]/long range pager #[phone]    If before 9AM or after 6PM, or on weekends/holidays, please call endocrine answering service for assistance ([phone]).  For nonurgent matters email [email or web address] for [email or web address].

## 2023-10-31 NOTE — PROGRESS NOTE ADULT - SUBJECTIVE AND OBJECTIVE BOX
Danielle Deleon MD   [facility]  Teams preferred  Pager: [phone]    Patient is a 68y old  Female who presents with a chief complaint of cough, CP, SOB x1 week (30 Oct 2023 17:54)      INTERVAL HPI/OVERNIGHT EVENTS: no issues overnight. still awaiting stress test. feels better with regards to breathing    MEDICATIONS  (STANDING):  amLODIPine   Tablet 5 milliGRAM(s) Oral daily  apixaban 5 milliGRAM(s) Oral two times a day  atorvastatin 80 milliGRAM(s) Oral at bedtime  benzonatate 100 milliGRAM(s) Oral every 8 hours  dextrose 5%. 1000 milliLiter(s) (50 mL/Hr) IV Continuous <Continuous>  dextrose 5%. 1000 milliLiter(s) (100 mL/Hr) IV Continuous <Continuous>  dextrose 50% Injectable 25 Gram(s) IV Push once  dextrose 50% Injectable 25 Gram(s) IV Push once  dextrose 50% Injectable 12.5 Gram(s) IV Push once  DULoxetine 30 milliGRAM(s) Oral daily  FLUoxetine 20 milliGRAM(s) Oral daily  glucagon  Injectable 1 milliGRAM(s) IntraMuscular once  influenza  Vaccine (HIGH DOSE) 0.7 milliLiter(s) IntraMuscular once  insulin glargine Injectable (LANTUS) 40 Unit(s) SubCutaneous at bedtime  insulin lispro (ADMELOG) corrective regimen sliding scale   SubCutaneous three times a day before meals  insulin lispro (ADMELOG) corrective regimen sliding scale   SubCutaneous at bedtime  insulin lispro Injectable (ADMELOG) 12 Unit(s) SubCutaneous three times a day before meals  lactated ringers. 1000 milliLiter(s) (100 mL/Hr) IV Continuous <Continuous>  lactulose Syrup 10 Gram(s) Oral two times a day  psyllium Powder 1 Packet(s) Oral daily  tadalafil 20 milliGRAM(s) Oral daily  tiotropium 2.5 MICROgram(s) Inhaler 2 Puff(s) Inhalation daily  traZODone 150 milliGRAM(s) Oral at bedtime    MEDICATIONS  (PRN):  acetaminophen     Tablet .. 650 milliGRAM(s) Oral every 6 hours PRN Temp greater or equal to 38C (100.4F), Mild Pain (1 - 3)  albuterol/ipratropium for Nebulization 3 milliLiter(s) Nebulizer every 6 hours PRN Shortness of Breath and/or Wheezing  cyclobenzaprine 10 milliGRAM(s) Oral three times a day PRN Muscle Spasm  dextrose Oral Gel 15 Gram(s) Oral once PRN Blood Glucose LESS THAN 70 milliGRAM(s)/deciliter  guaiFENesin Oral Liquid (Sugar-Free) 200 milliGRAM(s) Oral every 6 hours PRN Cough  hydrOXYzine hydrochloride 25 milliGRAM(s) Oral two times a day PRN Itching  melatonin 3 milliGRAM(s) Oral at bedtime PRN Insomnia  methocarbamol 750 milliGRAM(s) Oral three times a day PRN Muscle Spasm      Allergies    No Known Allergies    Intolerances        REVIEW OF SYSTEMS:  Please see interval HPI:    Vital Signs Last 24 Hrs  T(C): 36.5 (31 Oct 2023 13:55), Max: 36.8 (31 Oct 2023 01:00)  T(F): 97.7 (31 Oct 2023 13:55), Max: 98.2 (31 Oct 2023 01:00)  HR: 84 (31 Oct 2023 13:55) (80 - 90)  BP: 104/60 (31 Oct 2023 13:55) (104/56 - 144/86)  BP(mean): --  RR: 17 (31 Oct 2023 13:55) (17 - 18)  SpO2: 96% (31 Oct 2023 13:55) (95% - 98%)    Parameters below as of 31 Oct 2023 13:55  Patient On (Oxygen Delivery Method): nasal cannula      I&O's Detail        PHYSICAL EXAM:  Vital Signs Last 24 Hrs  T(C): 36.5 (31 Oct 2023 13:55), Max: 36.8 (31 Oct 2023 01:00)  T(F): 97.7 (31 Oct 2023 13:55), Max: 98.2 (31 Oct 2023 01:00)  HR: 84 (31 Oct 2023 13:55) (80 - 90)  BP: 104/60 (31 Oct 2023 13:55) (104/56 - 144/86)  BP(mean): --  RR: 17 (31 Oct 2023 13:55) (17 - 18)  SpO2: 96% (31 Oct 2023 13:55) (95% - 98%)    Parameters below as of 31 Oct 2023 13:55  Patient On (Oxygen Delivery Method): nasal cannula      CONSTITUTIONAL: NAD,   ENMT: Moist oral mucosa,   RESPIRATORY: Normal respiratory effort; no wheezing, mostly CTA  CARDIOVASCULAR: Regular rate and rhythm, normal S1 and S2, no murmur/rub/gallop; No lower extremity edema;   ABDOMEN: Nontender to palpation, normoactive bowel sounds, no rebound/guarding; No hepatosplenomegaly  EXT: no edema b/l  NEUROLOGY: alert, following commands  SKIN: No rashes; no palpable lesions      LABS:                        15.6   11.84 )-----------( 243      ( 31 Oct 2023 06:41 )             48.4     31 Oct 2023 06:41    139    |  101    |  60     ----------------------------<  166    3.6     |  26     |  1.11     Ca    8.7        31 Oct 2023 06:41    TPro  7.4    /  Alb  3.6    /  TBili  0.4    /  DBili  x      /  AST  10     /  ALT  11     /  AlkPhos  87     31 Oct 2023 06:41      CAPILLARY BLOOD GLUCOSE      POCT Blood Glucose.: 207 mg/dL (31 Oct 2023 12:37)  POCT Blood Glucose.: 258 mg/dL (31 Oct 2023 10:53)  POCT Blood Glucose.: 230 mg/dL (30 Oct 2023 21:52)  POCT Blood Glucose.: 192 mg/dL (30 Oct 2023 17:21)    BLOOD CULTURE    RADIOLOGY & ADDITIONAL TESTS:    Imaging Personally Reviewed:  [ ] YES     Consultant(s) Notes Reviewed:      Care Discussed with Consultants/Other Providers:

## 2023-10-31 NOTE — PROGRESS NOTE ADULT - SUBJECTIVE AND OBJECTIVE BOX
Chief Complaint: DM 2    History: Patient seen at bedside. Had late breakfast today due to testing in the morning       MEDICATIONS  (STANDING):  amLODIPine   Tablet 5 milliGRAM(s) Oral daily  apixaban 5 milliGRAM(s) Oral two times a day  atorvastatin 80 milliGRAM(s) Oral at bedtime  benzonatate 100 milliGRAM(s) Oral every 8 hours  dextrose 5%. 1000 milliLiter(s) (50 mL/Hr) IV Continuous <Continuous>  dextrose 5%. 1000 milliLiter(s) (100 mL/Hr) IV Continuous <Continuous>  dextrose 50% Injectable 25 Gram(s) IV Push once  dextrose 50% Injectable 12.5 Gram(s) IV Push once  dextrose 50% Injectable 25 Gram(s) IV Push once  DULoxetine 30 milliGRAM(s) Oral daily  FLUoxetine 20 milliGRAM(s) Oral daily  glucagon  Injectable 1 milliGRAM(s) IntraMuscular once  influenza  Vaccine (HIGH DOSE) 0.7 milliLiter(s) IntraMuscular once  insulin glargine Injectable (LANTUS) 40 Unit(s) SubCutaneous at bedtime  insulin lispro (ADMELOG) corrective regimen sliding scale   SubCutaneous three times a day before meals  insulin lispro (ADMELOG) corrective regimen sliding scale   SubCutaneous at bedtime  insulin lispro Injectable (ADMELOG) 12 Unit(s) SubCutaneous three times a day before meals  lactated ringers. 1000 milliLiter(s) (100 mL/Hr) IV Continuous <Continuous>  lactulose Syrup 10 Gram(s) Oral two times a day  psyllium Powder 1 Packet(s) Oral daily  tadalafil 20 milliGRAM(s) Oral daily  tiotropium 2.5 MICROgram(s) Inhaler 2 Puff(s) Inhalation daily  traZODone 150 milliGRAM(s) Oral at bedtime    MEDICATIONS  (PRN):  acetaminophen     Tablet .. 650 milliGRAM(s) Oral every 6 hours PRN Temp greater or equal to 38C (100.4F), Mild Pain (1 - 3)  albuterol/ipratropium for Nebulization 3 milliLiter(s) Nebulizer every 6 hours PRN Shortness of Breath and/or Wheezing  cyclobenzaprine 10 milliGRAM(s) Oral three times a day PRN Muscle Spasm  dextrose Oral Gel 15 Gram(s) Oral once PRN Blood Glucose LESS THAN 70 milliGRAM(s)/deciliter  guaiFENesin Oral Liquid (Sugar-Free) 200 milliGRAM(s) Oral every 6 hours PRN Cough  hydrOXYzine hydrochloride 25 milliGRAM(s) Oral two times a day PRN Itching  melatonin 3 milliGRAM(s) Oral at bedtime PRN Insomnia  methocarbamol 750 milliGRAM(s) Oral three times a day PRN Muscle Spasm      Allergies    No Known Allergies    Intolerances      Review of Systems:  Cardiovascular: No chest pain  Respiratory: No SOB  GI: No nausea, vomiting  Endocrine: no hypoglycemia     PHYSICAL EXAM:  VITALS: T(C): 36.5 (10-31-23 @ 13:55)  T(F): 97.7 (10-31-23 @ 13:55), Max: 98.2 (10-31-23 @ 01:00)  HR: 84 (10-31-23 @ 13:55) (80 - 90)  BP: 104/60 (10-31-23 @ 13:55) (104/56 - 144/86)  RR:  (17 - 18)  SpO2:  (95% - 98%)  Wt(kg): --  GENERAL: NAD  EYES: No proptosis, anicteric  HEENT:  Atraumatic, Normocephalic  RESPIRATORY: unlabored respirations     CAPILLARY BLOOD GLUCOSE      POCT Blood Glucose.: 207 mg/dL (31 Oct 2023 12:37)  POCT Blood Glucose.: 258 mg/dL (31 Oct 2023 10:53)  POCT Blood Glucose.: 230 mg/dL (30 Oct 2023 21:52)  POCT Blood Glucose.: 192 mg/dL (30 Oct 2023 17:21)      10-31    139  |  101  |  60<H>  ----------------------------<  166<H>  3.6   |  26  |  1.11    eGFR: 54<L>    Ca    8.7      10-31  Mg     2.80     10-29  Phos  4.8     10-29    TPro  7.4  /  Alb  3.6  /  TBili  0.4  /  DBili  x   /  AST  10  /  ALT  11  /  AlkPhos  87  10-31      Thyroid Function Tests:          Anion Gap: 12 mmol/L (10-31-23 @ 06:41)  Anion Gap: 12 mmol/L (10-30-23 @ 10:30)  Anion Gap: 15 mmol/L (10-29-23 @ 06:39)          A1C with Estimated Average Glucose Result: 6.4 % (10-25-23 @ 22:05)      Diet, DASH/TLC:   Sodium & Cholesterol Restricted  Consistent Carbohydrate Evening Snack (CSTCHOSN) (10-28-23 @ 12:42)      Diet, DASH/TLC:   Sodium & Cholesterol Restricted  Consistent Carbohydrate Evening Snack (CSTCHOSN) (10-28-23 @ 12:42) [Active]       Chief Complaint: DM 2    History: Patient seen at bedside. Had late breakfast today due to testing in the morning   Tolerating meals, states slight nausea this AM, improving after large BM  Steroids now completed, last dose of Prednisone 10 mg on 10/30    MEDICATIONS  (STANDING):  amLODIPine   Tablet 5 milliGRAM(s) Oral daily  apixaban 5 milliGRAM(s) Oral two times a day  atorvastatin 80 milliGRAM(s) Oral at bedtime  benzonatate 100 milliGRAM(s) Oral every 8 hours  dextrose 5%. 1000 milliLiter(s) (50 mL/Hr) IV Continuous <Continuous>  dextrose 5%. 1000 milliLiter(s) (100 mL/Hr) IV Continuous <Continuous>  dextrose 50% Injectable 25 Gram(s) IV Push once  dextrose 50% Injectable 12.5 Gram(s) IV Push once  dextrose 50% Injectable 25 Gram(s) IV Push once  DULoxetine 30 milliGRAM(s) Oral daily  FLUoxetine 20 milliGRAM(s) Oral daily  glucagon  Injectable 1 milliGRAM(s) IntraMuscular once  influenza  Vaccine (HIGH DOSE) 0.7 milliLiter(s) IntraMuscular once  insulin glargine Injectable (LANTUS) 40 Unit(s) SubCutaneous at bedtime  insulin lispro (ADMELOG) corrective regimen sliding scale   SubCutaneous three times a day before meals  insulin lispro (ADMELOG) corrective regimen sliding scale   SubCutaneous at bedtime  insulin lispro Injectable (ADMELOG) 12 Unit(s) SubCutaneous three times a day before meals  lactated ringers. 1000 milliLiter(s) (100 mL/Hr) IV Continuous <Continuous>  lactulose Syrup 10 Gram(s) Oral two times a day  psyllium Powder 1 Packet(s) Oral daily  tadalafil 20 milliGRAM(s) Oral daily  tiotropium 2.5 MICROgram(s) Inhaler 2 Puff(s) Inhalation daily  traZODone 150 milliGRAM(s) Oral at bedtime    MEDICATIONS  (PRN):  acetaminophen     Tablet .. 650 milliGRAM(s) Oral every 6 hours PRN Temp greater or equal to 38C (100.4F), Mild Pain (1 - 3)  albuterol/ipratropium for Nebulization 3 milliLiter(s) Nebulizer every 6 hours PRN Shortness of Breath and/or Wheezing  cyclobenzaprine 10 milliGRAM(s) Oral three times a day PRN Muscle Spasm  dextrose Oral Gel 15 Gram(s) Oral once PRN Blood Glucose LESS THAN 70 milliGRAM(s)/deciliter  guaiFENesin Oral Liquid (Sugar-Free) 200 milliGRAM(s) Oral every 6 hours PRN Cough  hydrOXYzine hydrochloride 25 milliGRAM(s) Oral two times a day PRN Itching  melatonin 3 milliGRAM(s) Oral at bedtime PRN Insomnia  methocarbamol 750 milliGRAM(s) Oral three times a day PRN Muscle Spasm    No Known Allergies    Review of Systems:  Cardiovascular: No chest pain  Respiratory: No SOB  GI: No nausea, vomiting  Endocrine: no hypoglycemia     PHYSICAL EXAM:  VITALS: T(C): 36.5 (10-31-23 @ 13:55)  T(F): 97.7 (10-31-23 @ 13:55), Max: 98.2 (10-31-23 @ 01:00)  HR: 84 (10-31-23 @ 13:55) (80 - 90)  BP: 104/60 (10-31-23 @ 13:55) (104/56 - 144/86)  RR:  (17 - 18)  SpO2:  (95% - 98%)  Wt(kg): --  GENERAL: NAD  EYES: No proptosis, anicteric  HEENT:  Atraumatic, Normocephalic  RESPIRATORY: unlabored respirations     CAPILLARY BLOOD GLUCOSE    POCT Blood Glucose.: 207 mg/dL (31 Oct 2023 12:37)  POCT Blood Glucose.: 258 mg/dL (31 Oct 2023 10:53)  POCT Blood Glucose.: 230 mg/dL (30 Oct 2023 21:52)  POCT Blood Glucose.: 192 mg/dL (30 Oct 2023 17:21)      10-31    139  |  101  |  60<H>  ----------------------------<  166<H>  3.6   |  26  |  1.11    eGFR: 54<L>    Ca    8.7      10-31  Mg     2.80     10-29  Phos  4.8     10-29    TPro  7.4  /  Alb  3.6  /  TBili  0.4  /  DBili  x   /  AST  10  /  ALT  11  /  AlkPhos  87  10-31      Anion Gap: 12 mmol/L (10-31-23 @ 06:41)  Anion Gap: 12 mmol/L (10-30-23 @ 10:30)  Anion Gap: 15 mmol/L (10-29-23 @ 06:39)      A1C with Estimated Average Glucose Result: 6.4 % (10-25-23 @ 22:05)      Diet, DASH/TLC:   Sodium & Cholesterol Restricted  Consistent Carbohydrate Evening Snack (CSTCHOSN) (10-28-23 @ 12:42)

## 2023-10-31 NOTE — CHART NOTE - NSCHARTNOTEFT_GEN_A_CORE
As per RN, patient had episode of epistaxis while in the bathroom. Patient seen at bedside. Currently has no active bleeding from nares. Patient reports having hx of papillary carcinoma of nose and follows up with [facility] outpatient regularly. Has recurrent episodes of epistaxis even while at home which pt attributes to wearing nasal cannula. Requesting to see ENT in AM.    Currently epistaxis is resolved.  Will order ocean spray for nares and instructed to apply petroleum jelly around nares.  Will relay to day team for ENT c/s in AM.  Will continue to monitor overnight for further episodes of epistaxis.
Medicine Subsequent Hospital Care Note- ACP  CC: Called by nurse for chest pain since early this morning- patient seen at 10am., Patient complains of left sided pleuritic chest pain. pressure 2/10 dull  which has now resolved No alleviating or aggravating factors.   ROS:  Denies fever, chills, diaphoresis , malaise, night sweat, generalized weakness, SOB cough, sputum production, wheezing, hemoptysis,  BAUMAN, orthopnea, PND, palpitations, diaphoresis, lightheadedness, dizziness, syncope, edema. nausea, vomiting, diarrhea, constipation, abdominal pain, melena, hematochezia, dysphagia, dysuria, frequency, urgency, hematuria, nocturia.    -------------------------------------------------------------------------------------------------------------------------------------------------  Vital Signs:  Vital Signs Last 24 Hrs  T(C): 36.9 (10-28-23 @ 07:00), Max: 36.9 (10-28-23 @ 07:00)  T(F): 98.4 (10-28-23 @ 07:00), Max: 98.4 (10-28-23 @ 07:00)  HR: 82 (10-28-23 @ 10:26) (82 - 93)  BP: 126/78 (10-28-23 @ 07:00) (126/78 - 126/78)  RR: 20 (10-28-23 @ 07:00) (20 - 20)  SpO2: 93% (10-28-23 @ 07:00) (93% - 97%) on (O2)    Telemetry/Alarms:  General: WN/WD NAD  Neurology: Awake, nonfocal, DUNLAP x 4  Eyes: Scleras clear, PERRLA/ EOMI, Gross vision intact  ENT:Gross hearing intact, grossly patent pharynx, no stridor  Neck: Neck supple, trachea midline, No JVD,   Respiratory: CTA B/L, No wheezing, rales, rhonchi  CV: RRR, S1S2, no murmurs, rubs or gallops  Abdominal: Soft, NT, ND +BS,   Extremities: No edema, + peripheral pulses  Skin: No Rashes, Hematoma, Ecchymosis  Lymphatic: No Neck, axilla, groin LAD  Relevant labs, radiology and Medications reviewed                        14.6   11.34 )-----------( 243      ( 28 Oct 2023 06:00 )             46.1     10-28    136  |  94<L>  |  68<H>  ----------------------------<  287<H>  3.8   |  28  |  1.72<H>    Ca    9.5      28 Oct 2023 06:00  Phos  5.8     10-28  Mg     2.50     10-28    TPro  x   /  Alb  x   /  TBili  0.4  /  DBili  x   /  AST  x   /  ALT  x   /  AlkPhos  x   10-27    PT/INR - ( 27 Oct 2023 06:30 )   PT: 11.7 sec;   INR: 1.04 ratio           MEDICATIONS  (STANDING):  amLODIPine   Tablet 5 milliGRAM(s) Oral daily  apixaban 5 milliGRAM(s) Oral two times a day  atorvastatin 80 milliGRAM(s) Oral at bedtime  azithromycin   Tablet 500 milliGRAM(s) Oral daily  benzonatate 100 milliGRAM(s) Oral every 8 hours  dextrose 5%. 1000 milliLiter(s) (50 mL/Hr) IV Continuous <Continuous>  dextrose 5%. 1000 milliLiter(s) (100 mL/Hr) IV Continuous <Continuous>  dextrose 50% Injectable 25 Gram(s) IV Push once  dextrose 50% Injectable 25 Gram(s) IV Push once  dextrose 50% Injectable 12.5 Gram(s) IV Push once  DULoxetine 30 milliGRAM(s) Oral daily  FLUoxetine 20 milliGRAM(s) Oral daily  glucagon  Injectable 1 milliGRAM(s) IntraMuscular once  insulin glargine Injectable (LANTUS) 40 Unit(s) SubCutaneous at bedtime  insulin lispro (ADMELOG) corrective regimen sliding scale   SubCutaneous three times a day before meals  insulin lispro (ADMELOG) corrective regimen sliding scale   SubCutaneous at bedtime  insulin lispro Injectable (ADMELOG) 12 Unit(s) SubCutaneous three times a day before meals  lactated ringers. 1000 milliLiter(s) (100 mL/Hr) IV Continuous <Continuous>  lactulose Syrup 10 Gram(s) Oral two times a day  polyethylene glycol 3350 17 Gram(s) Oral daily  predniSONE   Tablet 40 milliGRAM(s) Oral daily  psyllium Powder 1 Packet(s) Oral daily  senna 2 Tablet(s) Oral at bedtime  tadalafil 20 milliGRAM(s) Oral daily  tiotropium 2.5 MICROgram(s) Inhaler 2 Puff(s) Inhalation daily  traZODone 150 milliGRAM(s) Oral at bedtime    MEDICATIONS  (PRN):  acetaminophen     Tablet .. 650 milliGRAM(s) Oral every 6 hours PRN Temp greater or equal to 38C (100.4F), Mild Pain (1 - 3)  albuterol/ipratropium for Nebulization 3 milliLiter(s) Nebulizer every 6 hours PRN Shortness of Breath and/or Wheezing  cyclobenzaprine 10 milliGRAM(s) Oral three times a day PRN Muscle Spasm  dextrose Oral Gel 15 Gram(s) Oral once PRN Blood Glucose LESS THAN 70 milliGRAM(s)/deciliter  guaiFENesin Oral Liquid (Sugar-Free) 200 milliGRAM(s) Oral every 6 hours PRN Cough  hydrOXYzine hydrochloride 25 milliGRAM(s) Oral two times a day PRN Itching  melatonin 3 milliGRAM(s) Oral at bedtime PRN Insomnia  methocarbamol 750 milliGRAM(s) Oral three times a day PRN Muscle Spasm    I&O's Summary    I reviewed the above lab results, tests, telemetry, and  EKG interpretation. .  Assessment  68y Female  w/ PAST MEDICAL & SURGICAL HISTORY:  Diabetes mellitus      History of COPD  H/O CHF  History of colostomy reversal  S/P colostomy  S/p small bowel obstruction  admitted with complaints of Patient is a 68y old  Female who presents with a chief complaint of cough, CP, SOB x1 week (28 Oct 2023 12:28)  .now c/o Chest pain    PLAN  Ekg + SR @ 85 bpm RBBB NO new changes  Will monitor patient closely   Analgesics. NTG as needed . Continue asa. For TST in am  Case d/w Dr Escobedo

## 2023-10-31 NOTE — GOALS OF CARE CONVERSATION - ADVANCED CARE PLANNING - CONVERSATION DETAILS
Discussed Pt's wishes in the event of an emergency. Discussed that this current illness, Pt's clinical status improved and she is expected to fully recover. Pt able to state that if her breathing were to deteriorate, she does not want to be on a ventilator. Additionally she does not want CPR and states she is DNR.    Her health care proxy is Alexandre Romero although Pt reports she may need to revisit that because she is concerned her son might be overwhelmed. But currently he is the HCP

## 2023-11-01 VITALS
RESPIRATION RATE: 18 BRPM | SYSTOLIC BLOOD PRESSURE: 112 MMHG | DIASTOLIC BLOOD PRESSURE: 83 MMHG | HEART RATE: 81 BPM | TEMPERATURE: 98 F | OXYGEN SATURATION: 100 %

## 2023-11-01 DIAGNOSIS — R04.0 EPISTAXIS: ICD-10-CM

## 2023-11-01 LAB
ANION GAP SERPL CALC-SCNC: 9 MMOL/L — SIGNIFICANT CHANGE UP (ref 7–14)
ANION GAP SERPL CALC-SCNC: 9 MMOL/L — SIGNIFICANT CHANGE UP (ref 7–14)
BUN SERPL-MCNC: 59 MG/DL — HIGH (ref 7–23)
BUN SERPL-MCNC: 59 MG/DL — HIGH (ref 7–23)
CALCIUM SERPL-MCNC: 8.8 MG/DL — SIGNIFICANT CHANGE UP (ref 8.4–10.5)
CALCIUM SERPL-MCNC: 8.8 MG/DL — SIGNIFICANT CHANGE UP (ref 8.4–10.5)
CHLORIDE SERPL-SCNC: 101 MMOL/L — SIGNIFICANT CHANGE UP (ref 98–107)
CHLORIDE SERPL-SCNC: 101 MMOL/L — SIGNIFICANT CHANGE UP (ref 98–107)
CO2 SERPL-SCNC: 27 MMOL/L — SIGNIFICANT CHANGE UP (ref 22–31)
CO2 SERPL-SCNC: 27 MMOL/L — SIGNIFICANT CHANGE UP (ref 22–31)
CREAT SERPL-MCNC: 1.15 MG/DL — SIGNIFICANT CHANGE UP (ref 0.5–1.3)
CREAT SERPL-MCNC: 1.15 MG/DL — SIGNIFICANT CHANGE UP (ref 0.5–1.3)
EGFR: 52 ML/MIN/1.73M2 — LOW
EGFR: 52 ML/MIN/1.73M2 — LOW
GLUCOSE BLDC GLUCOMTR-MCNC: 110 MG/DL — HIGH (ref 70–99)
GLUCOSE BLDC GLUCOMTR-MCNC: 110 MG/DL — HIGH (ref 70–99)
GLUCOSE BLDC GLUCOMTR-MCNC: 134 MG/DL — HIGH (ref 70–99)
GLUCOSE BLDC GLUCOMTR-MCNC: 134 MG/DL — HIGH (ref 70–99)
GLUCOSE BLDC GLUCOMTR-MCNC: 166 MG/DL — HIGH (ref 70–99)
GLUCOSE BLDC GLUCOMTR-MCNC: 166 MG/DL — HIGH (ref 70–99)
GLUCOSE SERPL-MCNC: 97 MG/DL — SIGNIFICANT CHANGE UP (ref 70–99)
GLUCOSE SERPL-MCNC: 97 MG/DL — SIGNIFICANT CHANGE UP (ref 70–99)
HCT VFR BLD CALC: 49.6 % — HIGH (ref 34.5–45)
HCT VFR BLD CALC: 49.6 % — HIGH (ref 34.5–45)
HGB BLD-MCNC: 15.6 G/DL — HIGH (ref 11.5–15.5)
HGB BLD-MCNC: 15.6 G/DL — HIGH (ref 11.5–15.5)
MAGNESIUM SERPL-MCNC: 3.4 MG/DL — HIGH (ref 1.6–2.6)
MAGNESIUM SERPL-MCNC: 3.4 MG/DL — HIGH (ref 1.6–2.6)
MCHC RBC-ENTMCNC: 29.4 PG — SIGNIFICANT CHANGE UP (ref 27–34)
MCHC RBC-ENTMCNC: 29.4 PG — SIGNIFICANT CHANGE UP (ref 27–34)
MCHC RBC-ENTMCNC: 31.5 GM/DL — LOW (ref 32–36)
MCHC RBC-ENTMCNC: 31.5 GM/DL — LOW (ref 32–36)
MCV RBC AUTO: 93.6 FL — SIGNIFICANT CHANGE UP (ref 80–100)
MCV RBC AUTO: 93.6 FL — SIGNIFICANT CHANGE UP (ref 80–100)
NRBC # BLD: 0 /100 WBCS — SIGNIFICANT CHANGE UP (ref 0–0)
NRBC # BLD: 0 /100 WBCS — SIGNIFICANT CHANGE UP (ref 0–0)
NRBC # FLD: 0 K/UL — SIGNIFICANT CHANGE UP (ref 0–0)
NRBC # FLD: 0 K/UL — SIGNIFICANT CHANGE UP (ref 0–0)
PHOSPHATE SERPL-MCNC: 3.3 MG/DL — SIGNIFICANT CHANGE UP (ref 2.5–4.5)
PHOSPHATE SERPL-MCNC: 3.3 MG/DL — SIGNIFICANT CHANGE UP (ref 2.5–4.5)
PLATELET # BLD AUTO: 248 K/UL — SIGNIFICANT CHANGE UP (ref 150–400)
PLATELET # BLD AUTO: 248 K/UL — SIGNIFICANT CHANGE UP (ref 150–400)
POTASSIUM SERPL-MCNC: 4.3 MMOL/L — SIGNIFICANT CHANGE UP (ref 3.5–5.3)
POTASSIUM SERPL-MCNC: 4.3 MMOL/L — SIGNIFICANT CHANGE UP (ref 3.5–5.3)
POTASSIUM SERPL-SCNC: 4.3 MMOL/L — SIGNIFICANT CHANGE UP (ref 3.5–5.3)
POTASSIUM SERPL-SCNC: 4.3 MMOL/L — SIGNIFICANT CHANGE UP (ref 3.5–5.3)
RBC # BLD: 5.3 M/UL — HIGH (ref 3.8–5.2)
RBC # BLD: 5.3 M/UL — HIGH (ref 3.8–5.2)
RBC # FLD: 13.3 % — SIGNIFICANT CHANGE UP (ref 10.3–14.5)
RBC # FLD: 13.3 % — SIGNIFICANT CHANGE UP (ref 10.3–14.5)
SODIUM SERPL-SCNC: 137 MMOL/L — SIGNIFICANT CHANGE UP (ref 135–145)
SODIUM SERPL-SCNC: 137 MMOL/L — SIGNIFICANT CHANGE UP (ref 135–145)
WBC # BLD: 10.8 K/UL — HIGH (ref 3.8–10.5)
WBC # BLD: 10.8 K/UL — HIGH (ref 3.8–10.5)
WBC # FLD AUTO: 10.8 K/UL — HIGH (ref 3.8–10.5)
WBC # FLD AUTO: 10.8 K/UL — HIGH (ref 3.8–10.5)

## 2023-11-01 PROCEDURE — 99232 SBSQ HOSP IP/OBS MODERATE 35: CPT

## 2023-11-01 PROCEDURE — 99239 HOSP IP/OBS DSCHRG MGMT >30: CPT

## 2023-11-01 PROCEDURE — 78451 HT MUSCLE IMAGE SPECT SING: CPT | Mod: 26

## 2023-11-01 RX ORDER — INSULIN LISPRO 100/ML
10 VIAL (ML) SUBCUTANEOUS
Refills: 0 | Status: DISCONTINUED | OUTPATIENT
Start: 2023-11-01 | End: 2023-11-01

## 2023-11-01 RX ORDER — SODIUM CHLORIDE 0.65 %
1 AEROSOL, SPRAY (ML) NASAL
Qty: 1 | Refills: 0
Start: 2023-11-01 | End: 2023-11-07

## 2023-11-01 RX ORDER — TADALAFIL 10 MG/1
1 TABLET, FILM COATED ORAL
Qty: 0 | Refills: 0 | DISCHARGE
Start: 2023-11-01

## 2023-11-01 RX ORDER — UMECLIDINIUM 62.5 UG/1
1 AEROSOL, POWDER ORAL
Qty: 1 | Refills: 0
Start: 2023-11-01 | End: 2023-11-30

## 2023-11-01 RX ORDER — METOLAZONE 5 MG/1
0 TABLET ORAL
Qty: 0 | Refills: 0 | DISCHARGE

## 2023-11-01 RX ORDER — SODIUM CHLORIDE 0.65 %
1 AEROSOL, SPRAY (ML) NASAL
Refills: 0 | Status: DISCONTINUED | OUTPATIENT
Start: 2023-11-01 | End: 2023-11-01

## 2023-11-01 RX ORDER — TIOTROPIUM BROMIDE 18 UG/1
2 CAPSULE ORAL; RESPIRATORY (INHALATION)
Qty: 1 | Refills: 0
Start: 2023-11-01 | End: 2023-11-30

## 2023-11-01 RX ORDER — TADALAFIL 10 MG/1
0 TABLET, FILM COATED ORAL
Qty: 0 | Refills: 0 | DISCHARGE

## 2023-11-01 RX ORDER — TIOTROPIUM BROMIDE 18 UG/1
2 CAPSULE ORAL; RESPIRATORY (INHALATION)
Qty: 0 | Refills: 0 | DISCHARGE
Start: 2023-11-01

## 2023-11-01 RX ADMIN — METHOCARBAMOL 750 MILLIGRAM(S): 500 TABLET, FILM COATED ORAL at 15:16

## 2023-11-01 RX ADMIN — Medication 12 UNIT(S): at 09:16

## 2023-11-01 RX ADMIN — Medication 10 UNIT(S): at 12:56

## 2023-11-01 RX ADMIN — APIXABAN 5 MILLIGRAM(S): 2.5 TABLET, FILM COATED ORAL at 18:22

## 2023-11-01 RX ADMIN — Medication 100 MILLIGRAM(S): at 15:04

## 2023-11-01 RX ADMIN — TADALAFIL 20 MILLIGRAM(S): 10 TABLET, FILM COATED ORAL at 15:05

## 2023-11-01 RX ADMIN — Medication 100 MILLIGRAM(S): at 05:52

## 2023-11-01 RX ADMIN — Medication 20 MILLIGRAM(S): at 15:05

## 2023-11-01 RX ADMIN — Medication 1 PACKET(S): at 15:05

## 2023-11-01 RX ADMIN — Medication 10 UNIT(S): at 18:22

## 2023-11-01 RX ADMIN — Medication 1: at 12:56

## 2023-11-01 RX ADMIN — APIXABAN 5 MILLIGRAM(S): 2.5 TABLET, FILM COATED ORAL at 05:52

## 2023-11-01 RX ADMIN — AMLODIPINE BESYLATE 5 MILLIGRAM(S): 2.5 TABLET ORAL at 05:52

## 2023-11-01 RX ADMIN — DULOXETINE HYDROCHLORIDE 30 MILLIGRAM(S): 30 CAPSULE, DELAYED RELEASE ORAL at 15:05

## 2023-11-01 RX ADMIN — TIOTROPIUM BROMIDE 2 PUFF(S): 18 CAPSULE ORAL; RESPIRATORY (INHALATION) at 10:00

## 2023-11-01 NOTE — PROVIDER CONTACT NOTE (OTHER) - ASSESSMENT
vitals taken and documented, meds given
[facility].  [facility], a taxi was assigned, pt and unit was called, but no one answered so the trip was cancelled.  SW requested a new ride.

## 2023-11-01 NOTE — PROGRESS NOTE ADULT - ASSESSMENT
67 y/o F w/ hx of uncontrolled Type 2 DM w/ hyperglycemia complicated by neuropathy, HTN, HLD now with hyperglycemia exacerbated by prednisone     1. Type 2 diabetes mellitus with hyperglycemia, with long-term current use of insulin.   A1c 6.4%  Home Regimen: At home on Lantus 40 units daily and Novolog 7 units with meals. Rarely misses a dose. Also on Trulicity 1.5mg weekly and Jardiance 25mg daily. Now has Martin to check glucose.     While inpatient:   BG target 100-180 mg/dl  Reduce Lantus to 36 units SQ qHS  Reduce Admelog to 10 units SQ TID before meals (Hold if NPO/skips meal)   Admelog LOW dose correctional scale before meals, low dose at bedtime  Check BG before meals and bedtime  Consistent carbohydrate diet  Hypoglycemia protocol     Discharge Plan:  Likely home regimen (basal/bolus with doses TBD) plus resume Trulicity 1.5 mg SQ weekly and Jardiance 25 mg PO daily     2. HTN  Goal BP < 130/80  Continue Amlodipine  Outpt mc/cr ratio     3. Hyperlipidemia  LDL target less than 70  Continue Atorvastatin 80 mg daily if no contraindications     Charley Mckeon  Nurse Practitioner  Division of Endocrinology & Diabetes  In house pager #[phone]/long range pager #[phone]    If before 9AM or after 6PM, or on weekends/holidays, please call endocrine answering service for assistance ([phone]).  For nonurgent matters email [email or web address] for assistance.  67 y/o F w/ hx of uncontrolled Type 2 DM w/ hyperglycemia complicated by neuropathy, HTN, HLD now with hyperglycemia exacerbated by prednisone     1. Type 2 diabetes mellitus with hyperglycemia, with long-term current use of insulin.   A1c 6.4%  Home Regimen: At home on Lantus 40 units daily and Novolog 7 units with meals. Rarely misses a dose. Also on Trulicity 1.5mg weekly and Jardiance 25mg daily. Now has Martin to check glucose.     While inpatient:   BG target 100-180 mg/dl  Reduce Lantus to 36 units SQ qHS  Reduce Admelog to 10 units SQ TID before meals (Hold if NPO/skips meal)   Admelog LOW dose correctional scale before meals, low dose at bedtime  Check BG before meals and bedtime  Consistent carbohydrate diet  Hypoglycemia protocol     Discharge Plan:  If discharged today recommend Lantus 40 units SQ qHS, Novolog 7 units SQ TID before meals (Hold if skips meal), plus Trulicity 1.5 mg SQ weekly and Jardiance 25 mg PO daily     2. HTN  Goal BP < 130/80  Continue Amlodipine  Outpt mc/cr ratio     3. Hyperlipidemia  LDL target less than 70  Continue Atorvastatin 80 mg daily if no contraindications     Charley Mckeon  Nurse Practitioner  Division of Endocrinology & Diabetes  In house pager #[phone]/long range pager #[phone]    If before 9AM or after 6PM, or on weekends/holidays, please call endocrine answering service for assistance ([phone]).  For nonurgent matters email [email or web address] for assistance.

## 2023-11-01 NOTE — PHYSICAL THERAPY INITIAL EVALUATION ADULT - GAIT DISTANCE, PT EVAL
Pt complaint of shortness of breath during ambulation, oxygen saturation: 93%, HR: 84 beats per minute/75 feet

## 2023-11-01 NOTE — CONSULT NOTE ADULT - PROBLEM SELECTOR RECOMMENDATION 9
1. Nasal saline sprays qid  2. trend CBC, H/H  3. Afrin with direct pressure x 10-15 min prn with additional bleeding  4. Change 02 delivery if possible to humidified face tent to lesson nasal trauma from Nasal cannula   5. Consider holding eliquis prn if cleared by cardiology with any additional episodes of bleeding  6. No ENT intervention at this time  will discuss with ENT attending
Diabetes Education and Nutrition Eval  Start Lantus 40 units qhs  Start Admelog 12 units qac  Low correction scale qac + bedtime  Goal glucose 100-180  Outpt. endo follow-up  Outpt. optho, podiatry, nephrology  Plan to d/c on home regimen.

## 2023-11-01 NOTE — PROGRESS NOTE ADULT - PROBLEM SELECTOR PLAN 3
Per o/p pulm, Cr around 1 as of 03/2023  - elevated Cr likely in the setting of diuretics with normal EF  Plan:  - resume spironolactone  - renally dose meds  - improving  -can consider small dose of diuretic on discharge

## 2023-11-01 NOTE — PHYSICAL THERAPY INITIAL EVALUATION ADULT - LEVEL OF INDEPENDENCE: STAIR NEGOTIATION, REHAB EVAL
[id number]       Pt deferred/unable to perform

## 2023-11-01 NOTE — PROGRESS NOTE ADULT - PROBLEM SELECTOR PLAN 2
Pt reports chronic hx of substernal crushing chest pain at rest. There is tenderness to the chest on exam but pt reports that it is a different pain. Poor METS at baseline, can walk <1 block.  -trop negative x 2   - EKG with RBBB with likely repol abnormality  - discussed with outpatient cardiology clinic, no recent stress test  - TTE without HFrEF or HFpEF. no wall motion abnormalities  Plan:  - not currently in ACS but symptoms are c/f unstable angina. given patient's symptoms and poor follow up, would benefit from inpatient stress test  - f/u stress test
Pt reports chronic hx of substernal crushing chest pain at rest. There is tenderness to the chest on exam but pt reports that it is a different pain. Poor METS at baseline, can walk <1 block.  -trop negative x 2   - EKG with RBBB with likely repol abnormality  - discussed with outpatient cardiology clinic, no recent stress test  - TTE without HFrEF or HFpEF. no wall motion abnormalities  Plan:  - not currently in ACS but symptoms are c/f unstable angina. given patient's symptoms and poor follow up, would benefit from inpatient stress test. awaiting this testing  - f/u stress test
Pt reports chronic hx of substernal crushing chest pain at rest. There is tenderness to the chest on exam but pt reports that it is a different pain. Poor METS at baseline, can walk <1 block.  -trop negative x 2   - EKG with RBBB with likely repol abnormality  - discussed with outpatient cardiology clinic, no recent stress test  - TTE without HFrEF or HFpEF. no wall motion abnormalities  Plan:  - not currently in ACS but symptoms are c/f unstable angina. given patient's symptoms and poor follow up, would benefit from inpatient stress test  - f/u stress test
Pt reports chronic hx of substernal crushing chest pain. Reproducible on exam.  -trop negative x 2   - EKG with RBBB with likely repol abnormality  Plan:  - not currently in ACS. will f/u TTE and reassess for improvement in chest pain. may need stress test as o/p given low exercise tolerance  - f/u TTE
Pt reports chronic hx of substernal crushing chest pain at rest. There is tenderness to the chest on exam but pt reports that it is a different pain. Poor METS at baseline, can walk <1 block.  -trop negative x 2   - EKG with RBBB with likely repol abnormality  - discussed with outpatient cardiology clinic, no recent stress test  - TTE without HFrEF or HFpEF. no wall motion abnormalities  Plan:  - not currently in ACS but symptoms are c/f unstable angina. given patient's symptoms and poor follow up, may benefit from inpatient stress test. will discuss with cardiology if patient needs to stay for stress test  - f/u cards recs  - f/u stress test
Pt reports chronic hx of substernal crushing chest pain at rest. There is tenderness to the chest on exam but pt reports that it is a different pain. Poor METS at baseline, can walk <1 block.  -trop negative x 2   - EKG with RBBB with likely repol abnormality  - discussed with outpatient cardiology clinic, no recent stress test  - TTE without HFrEF or HFpEF. no wall motion abnormalities  Plan:  - not currently in ACS but symptoms are c/f unstable angina. given patient's symptoms and poor follow up, would benefit from inpatient stress test. stress test negative. discussed with outpatient cards, pt will f/u on Monday and records were faxed  -on discharge torsemide was decreased, metolazone STOPPED and spironolactone continued
Pt reports chronic hx of substernal crushing chest pain at rest. There is tenderness to the chest on exam but pt reports that it is a different pain  -trop negative x 2   - EKG with RBBB with likely repol abnormality  - discussed with outpatient cardiology clinic, no recent stress test  Plan:  - not currently in ACS but symptoms are c/f unstable angina  - f/u TTE  - f/u stress test

## 2023-11-01 NOTE — PROGRESS NOTE ADULT - PROBLEM SELECTOR PROBLEM 2
Essential hypertension
Chest pain

## 2023-11-01 NOTE — PROGRESS NOTE ADULT - PROVIDER SPECIALTY LIST ADULT
Endocrinology
Pulmonology
Hospitalist
Endocrinology
Endocrinology
Hospitalist

## 2023-11-01 NOTE — PROGRESS NOTE ADULT - SUBJECTIVE AND OBJECTIVE BOX
Danielle Deleon MD   [facility] Medicine  Teams preferred  Pager: [phone]    Patient is a 68y old  Female who presents with a chief complaint of cough, CP, SOB x1 week (01 Nov 2023 16:19)      INTERVAL HPI/OVERNIGHT EVENTS: one episode of epistaxis overnight. otherwise no other issues. Pt reports breathing is improved. still having intermittent chest pain    MEDICATIONS  (STANDING):  amLODIPine   Tablet 5 milliGRAM(s) Oral daily  apixaban 5 milliGRAM(s) Oral two times a day  atorvastatin 80 milliGRAM(s) Oral at bedtime  benzonatate 100 milliGRAM(s) Oral every 8 hours  dextrose 5%. 1000 milliLiter(s) (50 mL/Hr) IV Continuous <Continuous>  dextrose 5%. 1000 milliLiter(s) (100 mL/Hr) IV Continuous <Continuous>  dextrose 50% Injectable 25 Gram(s) IV Push once  dextrose 50% Injectable 25 Gram(s) IV Push once  dextrose 50% Injectable 12.5 Gram(s) IV Push once  DULoxetine 30 milliGRAM(s) Oral daily  FLUoxetine 20 milliGRAM(s) Oral daily  glucagon  Injectable 1 milliGRAM(s) IntraMuscular once  influenza  Vaccine (HIGH DOSE) 0.7 milliLiter(s) IntraMuscular once  insulin glargine Injectable (LANTUS) 40 Unit(s) SubCutaneous at bedtime  insulin lispro (ADMELOG) corrective regimen sliding scale   SubCutaneous three times a day before meals  insulin lispro (ADMELOG) corrective regimen sliding scale   SubCutaneous at bedtime  insulin lispro Injectable (ADMELOG) 10 Unit(s) SubCutaneous three times a day before meals  lactated ringers. 1000 milliLiter(s) (100 mL/Hr) IV Continuous <Continuous>  lactulose Syrup 10 Gram(s) Oral two times a day  psyllium Powder 1 Packet(s) Oral daily  sodium chloride 0.65% Nasal 1 Spray(s) Both Nostrils four times a day  tadalafil 20 milliGRAM(s) Oral daily  tiotropium 2.5 MICROgram(s) Inhaler 2 Puff(s) Inhalation daily  traZODone 150 milliGRAM(s) Oral at bedtime    MEDICATIONS  (PRN):  acetaminophen     Tablet .. 650 milliGRAM(s) Oral every 6 hours PRN Temp greater or equal to 38C (100.4F), Mild Pain (1 - 3)  albuterol/ipratropium for Nebulization 3 milliLiter(s) Nebulizer every 6 hours PRN Shortness of Breath and/or Wheezing  cyclobenzaprine 10 milliGRAM(s) Oral three times a day PRN Muscle Spasm  dextrose Oral Gel 15 Gram(s) Oral once PRN Blood Glucose LESS THAN 70 milliGRAM(s)/deciliter  guaiFENesin Oral Liquid (Sugar-Free) 200 milliGRAM(s) Oral every 6 hours PRN Cough  hydrOXYzine hydrochloride 25 milliGRAM(s) Oral two times a day PRN Itching  melatonin 3 milliGRAM(s) Oral at bedtime PRN Insomnia  methocarbamol 750 milliGRAM(s) Oral three times a day PRN Muscle Spasm      Allergies    No Known Allergies    Intolerances        REVIEW OF SYSTEMS:  Please see interval HPI:    Vital Signs Last 24 Hrs  T(C): 36.7 (01 Nov 2023 17:05), Max: 36.7 (01 Nov 2023 17:05)  T(F): 98 (01 Nov 2023 17:05), Max: 98 (01 Nov 2023 17:05)  HR: 81 (01 Nov 2023 17:05) (80 - 87)  BP: 112/83 (01 Nov 2023 17:05) (112/83 - 127/75)  BP(mean): --  RR: 18 (01 Nov 2023 17:05) (17 - 18)  SpO2: 100% (01 Nov 2023 17:05) (100% - 100%)    Parameters below as of 01 Nov 2023 13:00  Patient On (Oxygen Delivery Method): nasal cannula  O2 Flow (L/min): 3    I&O's Detail        PHYSICAL EXAM:  Vital Signs Last 24 Hrs  T(C): 36.7 (01 Nov 2023 17:05), Max: 36.7 (01 Nov 2023 17:05)  T(F): 98 (01 Nov 2023 17:05), Max: 98 (01 Nov 2023 17:05)  HR: 81 (01 Nov 2023 17:05) (80 - 87)  BP: 112/83 (01 Nov 2023 17:05) (112/83 - 127/75)  BP(mean): --  RR: 18 (01 Nov 2023 17:05) (17 - 18)  SpO2: 100% (01 Nov 2023 17:05) (100% - 100%)    Parameters below as of 01 Nov 2023 13:00  Patient On (Oxygen Delivery Method): nasal cannula  O2 Flow (L/min): 3    CONSTITUTIONAL: NAD,   ENMT: Moist oral mucosa,   RESPIRATORY: Normal respiratory effort; lungs are clear to auscultation bilaterally on 3L NC  CARDIOVASCULAR: Regular rate and rhythm, normal S1 and S2, no murmur/rub/gallop; No lower extremity edema;   ABDOMEN: Nontender to palpation, normoactive bowel sounds, no rebound/guarding; No hepatosplenomegaly  EXT: no edema b/l  NEUROLOGY: alert, following commands  SKIN: No rashes; no palpable lesions      LABS:      Ca    8.8        01 Nov 2023 05:46        CAPILLARY BLOOD GLUCOSE      POCT Blood Glucose.: 134 mg/dL (01 Nov 2023 17:33)  POCT Blood Glucose.: 166 mg/dL (01 Nov 2023 12:13)  POCT Blood Glucose.: 110 mg/dL (01 Nov 2023 08:25)    BLOOD CULTURE    RADIOLOGY & ADDITIONAL TESTS:    Imaging Personally Reviewed:  [ ] YES     Consultant(s) Notes Reviewed:      Care Discussed with Consultants/Other Providers:

## 2023-11-01 NOTE — PROGRESS NOTE ADULT - PROBLEM SELECTOR PLAN 1
Hx of pHTN at home 2L O2 at rest, 3L with activity. Follows with Dr. Recio at [facility]. SOB after walking <1 block at baseline. On albuterol prn for COPD and tadalafil for pHTN. Not adherent to home Bipap 15/5 for WALTER. Reports ?2 hospitalizations for "breathing", 1 in 2019 and 1 last week, but unclear if actually for COPD as pt poor historian and reports she was there for different complaints as well (eg abdominal pain from ventral hernias). Now presented with wheezing, green phlegm with cough, SOB, requiring 6L in ED.  - discussed with Dr. Recio, pt very poorly adherent to medications and office visits. PASP 1.5 years ago 71. He is agreement to treat for COPD exacerbation, start on COPD meds and f/u o/p when stable for d/c  -Pt wanting to f/u with [facility]. virtual appointment made  - RVP/COVID neg  - CO2 69, not acidemic  - CXR with emphysema but no acute infiltrate or pulmonary edema noted  - CT chest: emphysema and atelectasis without focal consolidation  -   - TTE without HFrEF or HFpEF  Plan:  - presentation c/w with COPD exacerbation, likely in the setting of minimal medication/CPAP adherence. will treat for pneumonia as pt admitted with COPD exacerbation. however, will only treat for CAP as it is unclear if pt received abx at recent admission and low c/f severe pneumonia at this time given lack of leukocytosis or fever. less likely CHF exacerbation as pt does not appear fluid overloaded with low age-adjusted BNP  - s/p dex, will continue pred 40mg qd for total 5 day course to end 10/29  - duonebs q6 prn  - started tiotropium-->changed to incruse on discharge  - d/c'ed ceftriaxone as no consolidation on CT chest and normal sputum cx  - continue azithromycin x5 days

## 2023-11-01 NOTE — PROGRESS NOTE ADULT - PROBLEM SELECTOR PROBLEM 1
Type 2 diabetes mellitus with hyperglycemia, with long-term current use of insulin
Acute on chronic hypoxic respiratory failure
Type 2 diabetes mellitus with hyperglycemia, with long-term current use of insulin
Type 2 diabetes mellitus with hyperglycemia, with long-term current use of insulin
Acute on chronic hypoxic respiratory failure

## 2023-11-01 NOTE — CONSULT NOTE ADULT - SUBJECTIVE AND OBJECTIVE BOX
Pulmonology Consult Note    HPI:  68F with PMHx CHF, COPD on 3L home O2, DM2 on insulin, hx DVT/PE? on eliquis, hx SBO, hx muscle spasms presenting with chest pain, cough and SOB x1 week. The patient was admitted for abdominal pain 2/2 constipation per her history at [facility] ~10/11-10/14. She had small stools and was discharged. She was concerned at the time that this could be SBO as she had this in the past. Pain is improved but she is still constipated with last BM 3 days ago. She uses miralax at home. Over the last week she has been having SOB and cough productive of green phlegm. +chills, +PND, +rhinorrhea and sore throat. She endorses crushing chest pain over several weeks as well associated with SOB. She also notes LE edema but unclear over what time span. She stopped her metolazone she was previously taking 10mg MWF several months ago thinking she no longer needed. She is compliant with her torsemide 100mg BID. She feels as though her legs are heavier as if when she has CHF exacerbations.    Cards: [facility], does not know name  Pulm: Dr. Recio? (26 Oct 2023 01:52)      Pulmonology Hx:  Pt reportedly follows with pulmonology at [facility], has hx of Pulm HTN on tadalafil. Smoking hx since age of 18, has tried to quit several times, currently smoking a pack every 2 days, last smoked 1 week ago. Pt with hx of COPD, states she has had flares in the past but unsure if current sx feels similar, she is on home O2 3L at night and occasionally during the day as needed. Uses albuterol prn, has been using it the past few days with increased cough and SOB. Hx of heart failure, unable to lie flat but this is her baseline. She states she might have 'caught something' from other patient in the waiting room of [facility] during her recent admission. Notes increased 'junky cough' with subjective fevers at home and increased SOB/ 'chest tightness'.       Allergies    No Known Allergies    Intolerances        MEDICATIONS  (STANDING):  albuterol/ipratropium for Nebulization 3 milliLiter(s) Nebulizer every 6 hours  amLODIPine   Tablet 5 milliGRAM(s) Oral daily  apixaban 5 milliGRAM(s) Oral two times a day  atorvastatin 80 milliGRAM(s) Oral at bedtime  azithromycin   Tablet 500 milliGRAM(s) Oral every 24 hours  benzonatate 100 milliGRAM(s) Oral every 8 hours  budesonide 160 MICROgram(s)/formoterol 4.5 MICROgram(s) Inhaler 2 Puff(s) Inhalation two times a day  cefTRIAXone   IVPB 1000 milliGRAM(s) IV Intermittent every 24 hours  dextrose 5%. 1000 milliLiter(s) (50 mL/Hr) IV Continuous <Continuous>  dextrose 5%. 1000 milliLiter(s) (100 mL/Hr) IV Continuous <Continuous>  dextrose 50% Injectable 25 Gram(s) IV Push once  dextrose 50% Injectable 25 Gram(s) IV Push once  dextrose 50% Injectable 12.5 Gram(s) IV Push once  DULoxetine 30 milliGRAM(s) Oral daily  FLUoxetine 20 milliGRAM(s) Oral daily  glucagon  Injectable 1 milliGRAM(s) IntraMuscular once  insulin glargine Injectable (LANTUS) 33 Unit(s) SubCutaneous at bedtime  insulin lispro (ADMELOG) corrective regimen sliding scale   SubCutaneous three times a day before meals  insulin lispro (ADMELOG) corrective regimen sliding scale   SubCutaneous at bedtime  insulin lispro Injectable (ADMELOG) 7 Unit(s) SubCutaneous three times a day before meals  lactulose Syrup 10 Gram(s) Oral two times a day  polyethylene glycol 3350 17 Gram(s) Oral daily  predniSONE   Tablet 40 milliGRAM(s) Oral daily  senna 2 Tablet(s) Oral at bedtime  tadalafil 20 milliGRAM(s) Oral daily  torsemide 100 milliGRAM(s) Oral two times a day  traZODone 150 milliGRAM(s) Oral at bedtime    MEDICATIONS  (PRN):  acetaminophen     Tablet .. 650 milliGRAM(s) Oral every 6 hours PRN Temp greater or equal to 38C (100.4F), Mild Pain (1 - 3)  cyclobenzaprine 10 milliGRAM(s) Oral three times a day PRN Muscle Spasm  dextrose Oral Gel 15 Gram(s) Oral once PRN Blood Glucose LESS THAN 70 milliGRAM(s)/deciliter  guaiFENesin Oral Liquid (Sugar-Free) 200 milliGRAM(s) Oral every 6 hours PRN Cough  hydrOXYzine hydrochloride 25 milliGRAM(s) Oral two times a day PRN Itching  melatonin 3 milliGRAM(s) Oral at bedtime PRN Insomnia  methocarbamol 750 milliGRAM(s) Oral two times a day PRN Muscle Spasm      PAST MEDICAL & SURGICAL HISTORY:  Diabetes mellitus      History of COPD      H/O CHF      History of colostomy reversal      S/P colostomy      S/p small bowel obstruction          FAMILY HISTORY:  FH: diabetes mellitus    FH: heart disease        SOCIAL HISTORY: No EtOH, no tobacco    REVIEW OF SYSTEMS:    CONSTITUTIONAL: No weakness, fevers or chills  EYES/ENT: No visual changes;  No vertigo or throat pain   NECK: No pain or stiffness  RESPIRATORY: No cough, wheezing, hemoptysis; No shortness of breath  CARDIOVASCULAR: No chest pain or palpitations  GASTROINTESTINAL: No abdominal or epigastric pain. No nausea, vomiting, or hematemesis; No diarrhea or constipation. No melena or hematochezia.  GENITOURINARY: No dysuria, frequency or hematuria  NEUROLOGICAL: No numbness or weakness  SKIN: No itching, burning, rashes, or lesions   All other review of systems is negative unless indicated above.        T(F): 98.8 (10-26-23 @ 11:20), Max: 98.8 (10-26-23 @ 09:39)  HR: 89 (10-26-23 @ 11:20)  BP: 134/63 (10-26-23 @ 11:20)  RR: 18 (10-26-23 @ 11:20)  SpO2: 95% (10-26-23 @ 11:20)  Wt(kg): --    GENERAL: NAD, well-developed on room air fluctuating between 88-92%, on 4L sating 94%  HEAD:  Atraumatic, Normocephalic  EYES: EOMI, PERRLA, conjunctiva and sclera clear  NECK: Supple, No JVD  CHEST/LUNG:  mild course breath sounds right lower lobe > left, no wheezing or crackles auscultated.   HEART: Regular rate and rhythm; No murmurs, rubs, or gallops  ABDOMEN: Soft, Nontender, Nondistended; Bowel sounds present  EXTREMITIES:  2+ Peripheral Pulses, No clubbing, cyanosis, or edema  NEUROLOGY: non-focal  SKIN: No rashes or lesions                          15.2   6.79  )-----------( 226      ( 26 Oct 2023 10:40 )             49.9       10-26    140  |  96<L>  |  28<H>  ----------------------------<  303<H>  4.2   |  27  |  1.38<H>    Ca    10.1      26 Oct 2023 10:40  Phos  4.2     10-26  Mg     2.50     10-26    TPro  9.0<H>  /  Alb  4.3  /  TBili  0.4  /  DBili  x   /  AST  8   /  ALT  7   /  AlkPhos  106  10-26      Magnesium: 2.50 mg/dL (10-26 @ 10:40)  Phosphorus: 4.2 mg/dL (10-26 @ 10:40)      
CC: epistaxis     HPI: 68 year old female with a history of COPD on O2, HTN, on eliquis for possible hx of DVT and DM that presents to [facility] c/o cough and SOB. Patient is on O2 via nasal cannula and is complaining of intermittent epistaxis. Seen by an out patient ENT in past.      PAST MEDICAL & SURGICAL HISTORY:  Diabetes mellitus      History of COPD      H/O CHF      History of colostomy reversal      S/P colostomy      S/p small bowel obstruction        Allergies    No Known Allergies    Intolerances      MEDICATIONS  (STANDING):  amLODIPine   Tablet 5 milliGRAM(s) Oral daily  apixaban 5 milliGRAM(s) Oral two times a day  atorvastatin 80 milliGRAM(s) Oral at bedtime  benzonatate 100 milliGRAM(s) Oral every 8 hours  dextrose 5%. 1000 milliLiter(s) (50 mL/Hr) IV Continuous <Continuous>  dextrose 5%. 1000 milliLiter(s) (100 mL/Hr) IV Continuous <Continuous>  dextrose 50% Injectable 25 Gram(s) IV Push once  dextrose 50% Injectable 12.5 Gram(s) IV Push once  dextrose 50% Injectable 25 Gram(s) IV Push once  DULoxetine 30 milliGRAM(s) Oral daily  FLUoxetine 20 milliGRAM(s) Oral daily  glucagon  Injectable 1 milliGRAM(s) IntraMuscular once  influenza  Vaccine (HIGH DOSE) 0.7 milliLiter(s) IntraMuscular once  insulin glargine Injectable (LANTUS) 40 Unit(s) SubCutaneous at bedtime  insulin lispro (ADMELOG) corrective regimen sliding scale   SubCutaneous three times a day before meals  insulin lispro (ADMELOG) corrective regimen sliding scale   SubCutaneous at bedtime  insulin lispro Injectable (ADMELOG) 10 Unit(s) SubCutaneous three times a day before meals  lactated ringers. 1000 milliLiter(s) (100 mL/Hr) IV Continuous <Continuous>  lactulose Syrup 10 Gram(s) Oral two times a day  psyllium Powder 1 Packet(s) Oral daily  tadalafil 20 milliGRAM(s) Oral daily  tiotropium 2.5 MICROgram(s) Inhaler 2 Puff(s) Inhalation daily  traZODone 150 milliGRAM(s) Oral at bedtime    MEDICATIONS  (PRN):  acetaminophen     Tablet .. 650 milliGRAM(s) Oral every 6 hours PRN Temp greater or equal to 38C (100.4F), Mild Pain (1 - 3)  albuterol/ipratropium for Nebulization 3 milliLiter(s) Nebulizer every 6 hours PRN Shortness of Breath and/or Wheezing  cyclobenzaprine 10 milliGRAM(s) Oral three times a day PRN Muscle Spasm  dextrose Oral Gel 15 Gram(s) Oral once PRN Blood Glucose LESS THAN 70 milliGRAM(s)/deciliter  guaiFENesin Oral Liquid (Sugar-Free) 200 milliGRAM(s) Oral every 6 hours PRN Cough  hydrOXYzine hydrochloride 25 milliGRAM(s) Oral two times a day PRN Itching  melatonin 3 milliGRAM(s) Oral at bedtime PRN Insomnia  methocarbamol 750 milliGRAM(s) Oral three times a day PRN Muscle Spasm      ROS:   ENT: all negative except as noted in HPI   CV: denies palpitations  Pulm: denies SOB, cough, hemoptysis  GI: denies change in apetite, indigestion, n/v  : denies pertinent urinary symptoms, urgency  Neuro: denies numbness/tingling, loss of sensation  Psych: denies anxiety  MS: denies muscle weakness, instability  Heme: denies easy bruising or bleeding  Endo: denies heat/cold intolerance, excessive sweating  Vascular: denies LE edema    Vital Signs Last 24 Hrs  T(C): 36.6 (01 Nov 2023 09:00), Max: 36.9 (01 Nov 2023 05:50)  T(F): 97.8 (01 Nov 2023 09:00), Max: 98.4 (01 Nov 2023 05:50)  HR: 87 (01 Nov 2023 09:00) (77 - 98)  BP: 121/79 (01 Nov 2023 09:00) (104/60 - 126/76)  BP(mean): --  RR: 18 (01 Nov 2023 09:00) (17 - 19)  SpO2: 100% (01 Nov 2023 09:00) (95% - 100%)    Parameters below as of 01 Nov 2023 09:00  Patient On (Oxygen Delivery Method): nasal cannula  O2 Flow (L/min): 3                            15.6   10.80 )-----------( 248      ( 01 Nov 2023 05:46 )             49.6    11-01    137  |  101  |  59<H>  ----------------------------<  97  4.3   |  27  |  1.15    Ca    8.8      01 Nov 2023 05:46  Phos  3.3     11-01  Mg     3.40     11-01    TPro  7.4  /  Alb  3.6  /  TBili  0.4  /  DBili  x   /  AST  10  /  ALT  11  /  AlkPhos  87  10-31       PHYSICAL EXAM:  Gen: NAD  Skin: No rashes, bruises, or lesions  Head: Normocephalic, Atraumatic  Face: no edema, erythema, or fluctuance. Parotid glands soft without mass  Eyes: no scleral injection  Ears: Right - ear canal clear, TM intact without effusion or erythema. No evidence of any fluid drainage. No mastoid tenderness, erythema, or ear bulging            Left - ear canal clear, TM intact without effusion or erythema. No evidence of any fluid drainage. No mastoid tenderness, erythema, or ear bulging  Nose: Nares bilaterally patent, no discharge, Nasal cannula in place. B/L anterior septal abrasions and crusting. No active bleeding.   Mouth: No Stridor / Drooling / Trismus.  Mucosa moist, tongue/uvula midline, oropharynx clear  Neck: Flat, supple, no lymphadenopathy, trachea midline, no masses  Lymphatic: No lymphadenopathy  Resp: breathing easily, no stridor  CV: no peripheral edema/cyanosis  GI: nondistended   Peripheral vascular: no JVD or edema  Neuro: facial nerve intact, no facial droop    
    HPI:  67 y/o F w/ Type 2 DM x 10 years. Has endo at [facility]. Complicated by neuropathy. At home on Lantus 40 untis daily and Novolog 7 units with meals. Rarely misses a dose. Also on Trulicity 1.5mg weekly and Jardiance 25mg daily. Now has [facility] to check glucose. Mostly 100's Has experienced hypoglycemia in the past 3 months with symptoms. No pattern to hypoglycemia. +polyuria and polydipsia with diuretics. Mother with hx of Type 2 DM.  Also hx of CHF, COPD on 3L home O2, hx DVT/PE? on eliquis, hx SBO, hx muscle spasms presenting with chest pain, cough and SOB x1 week. The patient was admitted for abdominal pain 2/2 constipation per her history at [facility] ~10/11-10/14. She had small stools and was discharged. She was concerned at the time that this could be SBO as she had this in the past. Pain is improved but she is still constipated with last BM 3 days ago. She uses miralax at home. Over the last week she has been having SOB and cough productive of green phlegm. +chills, +PND, +rhinorrhea and sore throat. She endorses crushing chest pain over several weeks as well associated with SOB. She also notes LE edema but unclear over what time span. She stopped her metolazone she was previously taking 10mg MWF several months ago thinking she no longer needed. She is compliant with her torsemide 100mg BID. She feels as though her legs are heavier as if when she has CHF exacerbations.    Cards: [facility], does not know name  Pulm: Dr. Recio? (26 Oct 2023 01:52)      PAST MEDICAL & SURGICAL HISTORY:  Diabetes mellitus      History of COPD      H/O CHF      History of colostomy reversal      S/P colostomy      S/p small bowel obstruction          FAMILY HISTORY:  FH: diabetes mellitus- Mother    FH: heart disease        Social History: +tobacco use. No alcohol use    Outpatient Medications:  · 	FLUOXETIN(P) 20MG CAP: Last Dose Taken:    · 	METOLAZONE 10MG TAB: Last Dose Taken:    · 	spironolactone 25 mg oral tablet: Last Dose Taken:  , 2 tab(s) orally once a day  · 	TRAZODONE 150MG TAB: Last Dose Taken:    · 	AMLODIPINE 5MG TAB: Last Dose Taken:    · 	ALBUTEROL(V)  INH: Last Dose Taken:    · 	HYDROXYZ HCL 25MG TAB: Last Dose Taken:    · 	CYCLOBENZAPR 10MG TAB: Last Dose Taken:    · 	methocarbamol 750 mg oral tablet: Last Dose Taken:  , 2 tab(s) orally 2 times a day as needed for  muscle spasm  · 	SUMATRIPTAN 50MG TAB: Last Dose Taken:    · 	torsemide 100 mg oral tablet: Last Dose Taken:  , 1 tab(s) orally 2 times a day  · 	atorvastatin 80 mg oral tablet: Last Dose Taken:  , 1 tab(s) orally once a day (at bedtime)  · 	insulin glargine 100 units/mL subcutaneous solution: Last Dose Taken:  , 40 unit(s) subcutaneous once a day (at bedtime)  · 	insulin aspart 100 units/mL injectable solution: Last Dose Taken:  , 7 unit(s) injectable 3 times a day (with meals)  · 	TADALAFIL 20MG(A) TAB: Last Dose Taken:    · 	polyethylene glycol 3350 oral powder for reconstitution: Last Dose Taken:  , 17 each orally once a day  · 	DULOXETINE 30MG DR CAP: Last Dose Taken:    · 	Eliquis 5 mg oral tablet: Last Dose Taken:  , 1 tab(s) orally 2 times a day  · 	JARDIANCE 10MG TAB: Last Dose Taken:    · 	TRULICITY(4) 1.5/0.5 PEN: Last Dose Taken:      MEDICATIONS  (STANDING):  amLODIPine   Tablet 5 milliGRAM(s) Oral daily  apixaban 5 milliGRAM(s) Oral two times a day  atorvastatin 80 milliGRAM(s) Oral at bedtime  azithromycin   Tablet 500 milliGRAM(s) Oral daily  benzonatate 100 milliGRAM(s) Oral every 8 hours  dextrose 5%. 1000 milliLiter(s) (50 mL/Hr) IV Continuous <Continuous>  dextrose 5%. 1000 milliLiter(s) (100 mL/Hr) IV Continuous <Continuous>  dextrose 50% Injectable 25 Gram(s) IV Push once  dextrose 50% Injectable 25 Gram(s) IV Push once  dextrose 50% Injectable 12.5 Gram(s) IV Push once  DULoxetine 30 milliGRAM(s) Oral daily  FLUoxetine 20 milliGRAM(s) Oral daily  glucagon  Injectable 1 milliGRAM(s) IntraMuscular once  insulin glargine Injectable (LANTUS) 40 Unit(s) SubCutaneous at bedtime  insulin lispro (ADMELOG) corrective regimen sliding scale   SubCutaneous three times a day before meals  insulin lispro (ADMELOG) corrective regimen sliding scale   SubCutaneous at bedtime  insulin lispro Injectable (ADMELOG) 12 Unit(s) SubCutaneous three times a day before meals  lactated ringers. 1000 milliLiter(s) (100 mL/Hr) IV Continuous <Continuous>  lactulose Syrup 10 Gram(s) Oral two times a day  polyethylene glycol 3350 17 Gram(s) Oral daily  predniSONE   Tablet 40 milliGRAM(s) Oral daily  psyllium Powder 1 Packet(s) Oral daily  senna 2 Tablet(s) Oral at bedtime  tadalafil 20 milliGRAM(s) Oral daily  tiotropium 2.5 MICROgram(s) Inhaler 2 Puff(s) Inhalation daily  traZODone 150 milliGRAM(s) Oral at bedtime    MEDICATIONS  (PRN):  acetaminophen     Tablet .. 650 milliGRAM(s) Oral every 6 hours PRN Temp greater or equal to 38C (100.4F), Mild Pain (1 - 3)  albuterol/ipratropium for Nebulization 3 milliLiter(s) Nebulizer every 6 hours PRN Shortness of Breath and/or Wheezing  cyclobenzaprine 10 milliGRAM(s) Oral three times a day PRN Muscle Spasm  dextrose Oral Gel 15 Gram(s) Oral once PRN Blood Glucose LESS THAN 70 milliGRAM(s)/deciliter  guaiFENesin Oral Liquid (Sugar-Free) 200 milliGRAM(s) Oral every 6 hours PRN Cough  hydrOXYzine hydrochloride 25 milliGRAM(s) Oral two times a day PRN Itching  melatonin 3 milliGRAM(s) Oral at bedtime PRN Insomnia  methocarbamol 750 milliGRAM(s) Oral three times a day PRN Muscle Spasm      Allergies    No Known Allergies    Intolerances      Review of Systems:  Constitutional: No fever, No change in weight  Eyes: No blurry vision  Neuro: +migraine headache, +neuropathy  HEENT: No throat pain  Cardiovascular: No chest pain  Respiratory: No SOB  GI: No nausea or vomiting  : + polyuria with diuretics  Skin: no rash  Psych: no depression  Endocrine: + polydipsia with diuretics, No heat or cold intolerance, rest as noted in HPI  Hem/lymph: no swelling    All other review of systems negative      PHYSICAL EXAM:  VITALS: T(C): 36.9 (10-28-23 @ 07:00)  T(F): 98.4 (10-28-23 @ 07:00), Max: 98.4 (10-28-23 @ 07:00)  HR: 82 (10-28-23 @ 10:26) (82 - 93)  BP: 126/78 (10-28-23 @ 07:00) (126/78 - 126/78)  RR:  (20 - 20)  SpO2:  (93% - 97%)  Wt(kg): --  GENERAL: NAD at this time  EYES: No proptosis, EOMI  HEENT:  Atraumatic, Normocephalic,   THYROID: Normal size, no palpable nodules  RESPIRATORY:  full excursion, non-labored  CARDIOVASCULAR: Regular rhythm; No murmurs; no peripheral edema  GI: Soft, nontender, non distended, normal bowel sounds  SKIN: Dry, intact, No rashes or lesions  MUSCULOSKELETAL: normal strength  NEURO: follows commands  PSYCH: Alert and oriented x 3, normal affect, normal mood  CUSHING'S SIGNS: no striae      POCT Blood Glucose.: 330 mg/dL (10-28-23 @ 16:45)  POCT Blood Glucose.: 334 mg/dL (10-28-23 @ 12:33)  POCT Blood Glucose.: 237 mg/dL (10-28-23 @ 08:35)  POCT Blood Glucose.: 276 mg/dL (10-27-23 @ 21:32)  POCT Blood Glucose.: 345 mg/dL (10-27-23 @ 17:25)  POCT Blood Glucose.: 342 mg/dL (10-27-23 @ 12:43)  POCT Blood Glucose.: 333 mg/dL (10-27-23 @ 08:37)  POCT Blood Glucose.: 215 mg/dL (10-26-23 @ 23:29)  POCT Blood Glucose.: 212 mg/dL (10-26-23 @ 21:53)  POCT Blood Glucose.: 276 mg/dL (10-26-23 @ 17:29)  POCT Blood Glucose.: 311 mg/dL (10-26-23 @ 12:15)  POCT Blood Glucose.: 318 mg/dL (10-26-23 @ 11:21)  POCT Blood Glucose.: 224 mg/dL (10-26-23 @ 09:14)  POCT Blood Glucose.: 235 mg/dL (10-26-23 @ 08:50)  POCT Blood Glucose.: 293 mg/dL (10-26-23 @ 02:23)  POCT Blood Glucose.: 136 mg/dL (10-25-23 @ 20:49)                              14.6   11.34 )-----------( 243      ( 28 Oct 2023 06:00 )             46.1       10-28    136  |  94<L>  |  68<H>  ----------------------------<  287<H>  3.8   |  28  |  1.72<H>    eGFR: 32<L>    Ca    9.5      10-28  Mg     2.50     10-28  Phos  5.8     10-28    TPro  x   /  Alb  x   /  TBili  0.4  /  DBili  x   /  AST  x   /  ALT  x   /  AlkPhos  x   10-27    Thyroid Function Tests:            Radiology:

## 2023-11-01 NOTE — PROGRESS NOTE ADULT - PROBLEM SELECTOR PLAN 6
- as above, does not appear to be in acute exacerbation  - s/p metolazone 10mg x1 in ED. Does not take any longer for months. Used to be on 10mg MWF.  - follows cards [facility]  - TTE without HFpEF or HFrEF  Plan:  - hold home torsemide 100mg BID,  -resume low dose spironolactone

## 2023-11-01 NOTE — PROGRESS NOTE ADULT - SUBJECTIVE AND OBJECTIVE BOX
Chief Complaint: DM 2    History: Patient seen at bedside. Reports she is eating meals, denies n/v, denies s/s of hypoglycemia   Steroid courses now completed; tight control of FS noted last night at dinnertime and this AM    MEDICATIONS  (STANDING):  amLODIPine   Tablet 5 milliGRAM(s) Oral daily  apixaban 5 milliGRAM(s) Oral two times a day  atorvastatin 80 milliGRAM(s) Oral at bedtime  benzonatate 100 milliGRAM(s) Oral every 8 hours  dextrose 5%. 1000 milliLiter(s) (50 mL/Hr) IV Continuous <Continuous>  dextrose 5%. 1000 milliLiter(s) (100 mL/Hr) IV Continuous <Continuous>  dextrose 50% Injectable 25 Gram(s) IV Push once  dextrose 50% Injectable 12.5 Gram(s) IV Push once  dextrose 50% Injectable 25 Gram(s) IV Push once  DULoxetine 30 milliGRAM(s) Oral daily  FLUoxetine 20 milliGRAM(s) Oral daily  glucagon  Injectable 1 milliGRAM(s) IntraMuscular once  influenza  Vaccine (HIGH DOSE) 0.7 milliLiter(s) IntraMuscular once  insulin glargine Injectable (LANTUS) 40 Unit(s) SubCutaneous at bedtime  insulin lispro (ADMELOG) corrective regimen sliding scale   SubCutaneous three times a day before meals  insulin lispro (ADMELOG) corrective regimen sliding scale   SubCutaneous at bedtime  insulin lispro Injectable (ADMELOG) 10 Unit(s) SubCutaneous three times a day before meals  lactated ringers. 1000 milliLiter(s) (100 mL/Hr) IV Continuous <Continuous>  lactulose Syrup 10 Gram(s) Oral two times a day  psyllium Powder 1 Packet(s) Oral daily  sodium chloride 0.65% Nasal 1 Spray(s) Both Nostrils four times a day  tadalafil 20 milliGRAM(s) Oral daily  tiotropium 2.5 MICROgram(s) Inhaler 2 Puff(s) Inhalation daily  traZODone 150 milliGRAM(s) Oral at bedtime    MEDICATIONS  (PRN):  acetaminophen     Tablet .. 650 milliGRAM(s) Oral every 6 hours PRN Temp greater or equal to 38C (100.4F), Mild Pain (1 - 3)  albuterol/ipratropium for Nebulization 3 milliLiter(s) Nebulizer every 6 hours PRN Shortness of Breath and/or Wheezing  cyclobenzaprine 10 milliGRAM(s) Oral three times a day PRN Muscle Spasm  dextrose Oral Gel 15 Gram(s) Oral once PRN Blood Glucose LESS THAN 70 milliGRAM(s)/deciliter  guaiFENesin Oral Liquid (Sugar-Free) 200 milliGRAM(s) Oral every 6 hours PRN Cough  hydrOXYzine hydrochloride 25 milliGRAM(s) Oral two times a day PRN Itching  melatonin 3 milliGRAM(s) Oral at bedtime PRN Insomnia  methocarbamol 750 milliGRAM(s) Oral three times a day PRN Muscle Spasm    No Known Allergies    Review of Systems:  Cardiovascular: No chest pain  Respiratory: No SOB  GI: No nausea, vomiting  Endocrine: no hypoglycemia     PHYSICAL EXAM:  VITALS: T(C): 36.3 (11-01-23 @ 13:00)  T(F): 97.3 (11-01-23 @ 13:00), Max: 98.4 (11-01-23 @ 05:50)  HR: 87 (11-01-23 @ 15:48) (77 - 98)  BP: 127/75 (11-01-23 @ 13:00) (121/78 - 127/75)  RR:  (17 - 19)  SpO2:  (95% - 100%)  Wt(kg): --  GENERAL: NAD  EYES: No proptosis, anicteric  HEENT:  Atraumatic, Normocephalic  RESPIRATORY: unlabored respirations     CAPILLARY BLOOD GLUCOSE    POCT Blood Glucose.: 166 mg/dL (01 Nov 2023 12:13)  POCT Blood Glucose.: 110 mg/dL (01 Nov 2023 08:25)  POCT Blood Glucose.: 176 mg/dL (31 Oct 2023 21:43)  POCT Blood Glucose.: 75 mg/dL (31 Oct 2023 17:30)      11-01    137  |  101  |  59<H>  ----------------------------<  97  4.3   |  27  |  1.15    eGFR: 52<L>    Ca    8.8      11-01  Mg     3.40     11-01  Phos  3.3     11-01    TPro  7.4  /  Alb  3.6  /  TBili  0.4  /  DBili  x   /  AST  10  /  ALT  11  /  AlkPhos  87  10-31        Anion Gap: 9 mmol/L (11-01-23 @ 05:46)  Anion Gap: 12 mmol/L (10-31-23 @ 06:41)  Anion Gap: 12 mmol/L (10-30-23 @ 10:30)      A1C with Estimated Average Glucose Result: 6.4 % (10-25-23 @ 22:05)      Diet, DASH/TLC:   Sodium & Cholesterol Restricted  Consistent Carbohydrate Evening Snack (CSTCHOSN)  Caffeine Free (NOCAFF) (10-31-23 @ 17:48)

## 2023-11-01 NOTE — PHYSICAL THERAPY INITIAL EVALUATION ADULT - ADDITIONAL COMMENTS
Pt. left comfortable sitting edge of bed with all tubes/lines intact, call bell in reach and in NAD.

## 2023-11-01 NOTE — PROGRESS NOTE ADULT - PROBLEM SELECTOR PLAN 12
Eliquis 5mg BID  DASH/TLC CC
Eliquis 5mg BID  DASH/TLC CC fluid restriction

## 2023-11-01 NOTE — PROGRESS NOTE ADULT - PROBLEM SELECTOR PLAN 9
Hx of large ventral hernias after abdominal surgery in the past. Recent admission that per pt's report, sounds likely c/f bowel obstruction but was able to have BM.  -Passing gas, no abdominal pain  -senna, miralax, lactulose, psyllium no stemi

## 2023-11-02 PROBLEM — Z87.09 PERSONAL HISTORY OF OTHER DISEASES OF THE RESPIRATORY SYSTEM: Chronic | Status: ACTIVE | Noted: 2023-10-25

## 2023-11-03 ENCOUNTER — APPOINTMENT (OUTPATIENT)
Dept: PULMONOLOGY | Facility: CLINIC | Age: 68
End: 2023-11-03

## 2023-11-03 ENCOUNTER — NON-APPOINTMENT (OUTPATIENT)
Age: 68
End: 2023-11-03

## 2023-11-17 PROBLEM — E11.9 TYPE 2 DIABETES MELLITUS WITHOUT COMPLICATIONS: Chronic | Status: ACTIVE | Noted: 2023-10-25

## 2024-01-02 PROBLEM — Z86.79 PERSONAL HISTORY OF OTHER DISEASES OF THE CIRCULATORY SYSTEM: Chronic | Status: ACTIVE | Noted: 2023-10-25

## 2024-12-02 NOTE — CONSULT NOTE ADULT - CONSULT REQUESTED BY NAME
I called Rosalia of the clinic.   
Dr. Harrison
Medicine
Dr. Escobedo

## 2025-02-07 ENCOUNTER — APPOINTMENT (OUTPATIENT)
Age: 70
End: 2025-02-07
Payer: MEDICARE

## 2025-02-07 ENCOUNTER — NON-APPOINTMENT (OUTPATIENT)
Age: 70
End: 2025-02-07

## 2025-02-07 VITALS
DIASTOLIC BLOOD PRESSURE: 66 MMHG | OXYGEN SATURATION: 91 % | HEIGHT: 66 IN | SYSTOLIC BLOOD PRESSURE: 97 MMHG | BODY MASS INDEX: 39.53 KG/M2 | WEIGHT: 246 LBS | HEART RATE: 93 BPM

## 2025-02-07 DIAGNOSIS — F32.A DEPRESSION, UNSPECIFIED: ICD-10-CM

## 2025-02-07 DIAGNOSIS — F17.200 NICOTINE DEPENDENCE, UNSPECIFIED, UNCOMPLICATED: ICD-10-CM

## 2025-02-07 DIAGNOSIS — M54.50 LOW BACK PAIN, UNSPECIFIED: ICD-10-CM

## 2025-02-07 DIAGNOSIS — M54.2 CERVICALGIA: ICD-10-CM

## 2025-02-07 DIAGNOSIS — E78.00 PURE HYPERCHOLESTEROLEMIA, UNSPECIFIED: ICD-10-CM

## 2025-02-07 DIAGNOSIS — I10 ESSENTIAL (PRIMARY) HYPERTENSION: ICD-10-CM

## 2025-02-07 DIAGNOSIS — E11.69 TYPE 2 DIABETES MELLITUS WITH OTHER SPECIFIED COMPLICATION: ICD-10-CM

## 2025-02-07 DIAGNOSIS — E66.9 OBESITY, UNSPECIFIED: ICD-10-CM

## 2025-02-07 DIAGNOSIS — Z78.9 OTHER SPECIFIED HEALTH STATUS: ICD-10-CM

## 2025-02-07 PROCEDURE — 99204 OFFICE O/P NEW MOD 45 MIN: CPT

## 2025-02-07 PROCEDURE — 72050 X-RAY EXAM NECK SPINE 4/5VWS: CPT

## 2025-02-07 PROCEDURE — 72110 X-RAY EXAM L-2 SPINE 4/>VWS: CPT

## 2025-02-07 RX ORDER — DICLOFENAC SODIUM 10 MG/G
1 GEL TOPICAL DAILY
Qty: 1 | Refills: 2 | Status: ACTIVE | COMMUNITY
Start: 2025-02-07 | End: 1900-01-01

## 2025-02-07 RX ORDER — TIZANIDINE 2 MG/1
2 TABLET ORAL EVERY 6 HOURS
Qty: 56 | Refills: 1 | Status: ACTIVE | COMMUNITY
Start: 2025-02-07 | End: 1900-01-01

## 2025-02-07 RX ORDER — METOPROLOL TARTRATE 75 MG/1
TABLET, FILM COATED ORAL
Refills: 0 | Status: ACTIVE | COMMUNITY

## 2025-02-07 RX ORDER — ATORVASTATIN CALCIUM 80 MG/1
TABLET, FILM COATED ORAL
Refills: 0 | Status: ACTIVE | COMMUNITY

## 2025-02-07 RX ORDER — EMPAGLIFLOZIN 25 MG/1
TABLET, FILM COATED ORAL
Refills: 0 | Status: ACTIVE | COMMUNITY